# Patient Record
Sex: FEMALE | Race: WHITE | Employment: OTHER | URBAN - METROPOLITAN AREA
[De-identification: names, ages, dates, MRNs, and addresses within clinical notes are randomized per-mention and may not be internally consistent; named-entity substitution may affect disease eponyms.]

---

## 2022-07-15 ENCOUNTER — HOSPITAL ENCOUNTER (OUTPATIENT)
Dept: NEUROLOGY | Age: 62
Discharge: HOME | End: 2022-07-15
Payer: COMMERCIAL

## 2022-07-15 DIAGNOSIS — G56.01 CARPAL TUNNEL SYNDROME ON RIGHT: ICD-10-CM

## 2022-07-15 PROCEDURE — 95910 NRV CNDJ TEST 7-8 STUDIES: CPT

## 2022-07-15 PROCEDURE — 95886 MUSC TEST DONE W/N TEST COMP: CPT

## 2022-07-15 NOTE — PROCEDURES
[address]                      [address], [address] [facility]                             ELECTROMYOGRAM REPORT    PATIENT NAME: Nayana Augustine                      :        1960  MED REC NO:   [id number]                            ROOM:  ACCOUNT NO:   [de-identified]                           ADMIT DATE: 07/15/2022  PROVIDER:     Ria Rich MD    DATE OF EM/15/2022    REFERRING PROVIDER:  Traci Araiza MD.    REASON FOR STUDY:  The patient was having numbness in the hands, being  worse on the right side. FINDINGS:  Motor nerve conduction velocities are normal in all the  nerves tested. F-wave latency is borderline in the right median nerve and normal in  other nerves tested. Distal motor and sensory latencies are normal in the ulnar nerves, but  delayed in the median nerves, being worse on the right side. On concentric needle electrode examination, mild denervation changes are  present in the abductor pollicis brevis muscles bilaterally. 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 The patient is being tried on conservative management. Due to continued symptoms, she will need  decompression of the median nerves. Thank you Dr. Lesli Amanda for allowing me to see this patient. Please  feel free to call me if I can be of any further assistance regarding  this patient's evaluation.         Madie Richter MD    D: 07/15/2022 16:43:25       T: 07/15/2022 16:46:57     [email or web address]  Job#: [id number]     Doc#: [id number]    CC:

## 2022-08-06 LAB
SARS-COV-2, PCR: NOT DETECTED
SPECIMEN SOURCE: NORMAL

## 2023-10-14 ENCOUNTER — LAB (OUTPATIENT)
Dept: LAB | Facility: LAB | Age: 63
End: 2023-10-14
Payer: COMMERCIAL

## 2023-10-14 DIAGNOSIS — E03.9 HYPOTHYROIDISM, UNSPECIFIED: Primary | ICD-10-CM

## 2023-10-14 DIAGNOSIS — E78.5 HYPERLIPIDEMIA, UNSPECIFIED: ICD-10-CM

## 2023-10-14 DIAGNOSIS — E78.5 HYPERLIPIDEMIA, UNSPECIFIED: Primary | ICD-10-CM

## 2023-10-14 DIAGNOSIS — E03.9 HYPOTHYROIDISM, UNSPECIFIED: ICD-10-CM

## 2023-10-14 LAB
25(OH)D3 SERPL-MCNC: 40 NG/ML (ref 30–100)
ALBUMIN SERPL BCP-MCNC: 3.9 G/DL (ref 3.4–5)
ALP SERPL-CCNC: 66 U/L (ref 33–136)
ALT SERPL W P-5'-P-CCNC: 13 U/L (ref 7–45)
ANION GAP SERPL CALC-SCNC: 11 MMOL/L (ref 10–20)
AST SERPL W P-5'-P-CCNC: 14 U/L (ref 9–39)
BILIRUB SERPL-MCNC: 0.6 MG/DL (ref 0–1.2)
BUN SERPL-MCNC: 14 MG/DL (ref 6–23)
CALCIUM SERPL-MCNC: 9.1 MG/DL (ref 8.6–10.3)
CHLORIDE SERPL-SCNC: 105 MMOL/L (ref 98–107)
CHOLEST SERPL-MCNC: 228 MG/DL (ref 0–199)
CHOLESTEROL/HDL RATIO: 4.8
CO2 SERPL-SCNC: 26 MMOL/L (ref 21–32)
CREAT SERPL-MCNC: 0.85 MG/DL (ref 0.5–1.05)
ERYTHROCYTE [DISTWIDTH] IN BLOOD BY AUTOMATED COUNT: 13.4 % (ref 11.5–14.5)
GFR SERPL CREATININE-BSD FRML MDRD: 77 ML/MIN/1.73M*2
GLUCOSE SERPL-MCNC: 91 MG/DL (ref 74–99)
HCT VFR BLD AUTO: 40 % (ref 36–46)
HDLC SERPL-MCNC: 47.1 MG/DL
HGB BLD-MCNC: 12.5 G/DL (ref 12–16)
LDLC SERPL CALC-MCNC: 166 MG/DL
MCH RBC QN AUTO: 28.1 PG (ref 26–34)
MCHC RBC AUTO-ENTMCNC: 31.3 G/DL (ref 32–36)
MCV RBC AUTO: 90 FL (ref 80–100)
NON HDL CHOLESTEROL: 181 MG/DL (ref 0–149)
NRBC BLD-RTO: 0 /100 WBCS (ref 0–0)
PLATELET # BLD AUTO: 298 X10*3/UL (ref 150–450)
PMV BLD AUTO: 11.9 FL (ref 7.5–11.5)
POTASSIUM SERPL-SCNC: 4.3 MMOL/L (ref 3.5–5.3)
PROT SERPL-MCNC: 6.5 G/DL (ref 6.4–8.2)
RBC # BLD AUTO: 4.45 X10*6/UL (ref 4–5.2)
SODIUM SERPL-SCNC: 138 MMOL/L (ref 136–145)
T4 FREE SERPL-MCNC: 1.49 NG/DL (ref 0.61–1.12)
TRIGL SERPL-MCNC: 77 MG/DL (ref 0–149)
TSH SERPL-ACNC: 0.61 MIU/L (ref 0.44–3.98)
VLDL: 15 MG/DL (ref 0–40)
WBC # BLD AUTO: 6.5 X10*3/UL (ref 4.4–11.3)

## 2023-10-14 PROCEDURE — 80061 LIPID PANEL: CPT

## 2023-10-14 PROCEDURE — 84439 ASSAY OF FREE THYROXINE: CPT

## 2023-10-14 PROCEDURE — 85027 COMPLETE CBC AUTOMATED: CPT

## 2023-10-14 PROCEDURE — 36415 COLL VENOUS BLD VENIPUNCTURE: CPT

## 2023-10-14 PROCEDURE — 84443 ASSAY THYROID STIM HORMONE: CPT

## 2023-10-14 PROCEDURE — 80053 COMPREHEN METABOLIC PANEL: CPT

## 2023-10-14 PROCEDURE — 82306 VITAMIN D 25 HYDROXY: CPT

## 2023-10-17 PROBLEM — N95.1 MENOPAUSAL STATE: Status: ACTIVE | Noted: 2023-10-17

## 2023-10-17 PROBLEM — H69.91 DYSFUNCTION OF RIGHT EUSTACHIAN TUBE: Status: ACTIVE | Noted: 2023-10-17

## 2023-10-17 PROBLEM — G56.01 CARPAL TUNNEL SYNDROME OF RIGHT WRIST: Status: ACTIVE | Noted: 2023-10-17

## 2023-10-17 PROBLEM — G56.02 CARPAL TUNNEL SYNDROME OF LEFT WRIST: Status: ACTIVE | Noted: 2023-10-17

## 2023-10-17 PROBLEM — E66.9 CLASS 1 OBESITY WITH BODY MASS INDEX (BMI) OF 34.0 TO 34.9 IN ADULT: Status: ACTIVE | Noted: 2023-10-17

## 2023-10-17 PROBLEM — E66.811 CLASS 1 OBESITY WITH BODY MASS INDEX (BMI) OF 34.0 TO 34.9 IN ADULT: Status: ACTIVE | Noted: 2023-10-17

## 2023-10-17 PROBLEM — E78.5 HYPERLIPIDEMIA: Status: ACTIVE | Noted: 2023-10-17

## 2023-10-17 PROBLEM — E66.811 OBESITY (BMI 30.0-34.9): Status: ACTIVE | Noted: 2023-10-17

## 2023-10-17 PROBLEM — M25.542 PAIN IN THUMB JOINT WITH MOVEMENT OF LEFT HAND: Status: ACTIVE | Noted: 2023-10-17

## 2023-10-17 PROBLEM — J32.9 SINUSITIS, CHRONIC: Status: ACTIVE | Noted: 2023-10-17

## 2023-10-17 PROBLEM — G47.00 INSOMNIA: Status: ACTIVE | Noted: 2023-10-17

## 2023-10-17 PROBLEM — E03.9 HYPOTHYROIDISM: Status: ACTIVE | Noted: 2023-10-17

## 2023-10-17 PROBLEM — R20.2 NUMBNESS AND TINGLING IN LEFT HAND: Status: ACTIVE | Noted: 2023-10-17

## 2023-10-17 PROBLEM — R20.0 NUMBNESS AND TINGLING IN LEFT HAND: Status: ACTIVE | Noted: 2023-10-17

## 2023-10-17 PROBLEM — E66.9 OBESITY (BMI 30.0-34.9): Status: ACTIVE | Noted: 2023-10-17

## 2023-10-17 PROBLEM — F41.9 ANXIETY: Status: ACTIVE | Noted: 2023-10-17

## 2023-10-17 RX ORDER — ALBUTEROL SULFATE 90 UG/1
2 AEROSOL, METERED RESPIRATORY (INHALATION) EVERY 4 HOURS PRN
COMMUNITY
Start: 2014-12-30 | End: 2023-10-18 | Stop reason: WASHOUT

## 2023-10-17 RX ORDER — ACETAMINOPHEN 500 MG
5000 TABLET ORAL DAILY
COMMUNITY

## 2023-10-17 RX ORDER — CITALOPRAM 20 MG/1
20 TABLET, FILM COATED ORAL DAILY
COMMUNITY
End: 2023-10-18 | Stop reason: WASHOUT

## 2023-10-17 RX ORDER — CODEINE PHOSPHATE AND GUAIFENESIN 10; 100 MG/5ML; MG/5ML
5-10 SOLUTION ORAL DAILY PRN
COMMUNITY
Start: 2014-12-30 | End: 2023-10-18 | Stop reason: WASHOUT

## 2023-10-17 RX ORDER — LEVOTHYROXINE SODIUM 100 UG/1
100 TABLET ORAL DAILY
COMMUNITY
Start: 2019-05-02 | End: 2024-06-03 | Stop reason: SDUPTHER

## 2023-10-17 RX ORDER — PREDNISONE 20 MG/1
20 TABLET ORAL DAILY
COMMUNITY
Start: 2022-05-06 | End: 2023-10-18 | Stop reason: WASHOUT

## 2023-10-17 RX ORDER — HYDROXYZINE HYDROCHLORIDE 25 MG/1
.5-1 TABLET, FILM COATED ORAL NIGHTLY PRN
COMMUNITY
Start: 2021-05-16 | End: 2023-10-18 | Stop reason: SDUPTHER

## 2023-10-17 RX ORDER — ESCITALOPRAM OXALATE 5 MG/1
10 TABLET ORAL DAILY
COMMUNITY
Start: 2020-04-20 | End: 2023-10-18 | Stop reason: DRUGHIGH

## 2023-10-17 RX ORDER — FLUTICASONE PROPIONATE 50 MCG
2 SPRAY, SUSPENSION (ML) NASAL DAILY
COMMUNITY
Start: 2022-07-17 | End: 2024-05-17 | Stop reason: WASHOUT

## 2023-10-17 RX ORDER — LORATADINE PSEUDOEPHEDRINE SULFATE 10; 240 MG/1; MG/1
1 TABLET, EXTENDED RELEASE ORAL DAILY
COMMUNITY
Start: 2004-07-19 | End: 2023-10-18 | Stop reason: ALTCHOICE

## 2023-10-17 RX ORDER — OXYCODONE AND ACETAMINOPHEN 5; 325 MG/1; MG/1
TABLET ORAL
COMMUNITY
Start: 2023-10-03 | End: 2023-10-18 | Stop reason: WASHOUT

## 2023-10-17 RX ORDER — THYROID 120 MG/1
120 TABLET ORAL DAILY
COMMUNITY
End: 2023-10-18 | Stop reason: WASHOUT

## 2023-10-17 RX ORDER — LEVOTHYROXINE SODIUM 112 UG/1
1 TABLET ORAL DAILY
COMMUNITY
Start: 2004-07-19 | End: 2023-10-18 | Stop reason: WASHOUT

## 2023-10-17 RX ORDER — BENZONATATE 100 MG/1
100 CAPSULE ORAL 3 TIMES DAILY PRN
COMMUNITY
Start: 2014-12-30 | End: 2023-10-18 | Stop reason: WASHOUT

## 2023-10-18 ENCOUNTER — OFFICE VISIT (OUTPATIENT)
Dept: PRIMARY CARE | Facility: CLINIC | Age: 63
End: 2023-10-18
Payer: COMMERCIAL

## 2023-10-18 VITALS — TEMPERATURE: 97.2 F | DIASTOLIC BLOOD PRESSURE: 84 MMHG | SYSTOLIC BLOOD PRESSURE: 131 MMHG | HEART RATE: 74 BPM

## 2023-10-18 DIAGNOSIS — E78.5 HYPERLIPIDEMIA, UNSPECIFIED HYPERLIPIDEMIA TYPE: ICD-10-CM

## 2023-10-18 DIAGNOSIS — F51.04 PSYCHOPHYSIOLOGICAL INSOMNIA: ICD-10-CM

## 2023-10-18 DIAGNOSIS — F41.9 ANXIETY: Primary | ICD-10-CM

## 2023-10-18 DIAGNOSIS — Z12.11 ENCOUNTER FOR SCREENING FOR MALIGNANT NEOPLASM OF COLON: ICD-10-CM

## 2023-10-18 PROCEDURE — 99214 OFFICE O/P EST MOD 30 MIN: CPT | Performed by: FAMILY MEDICINE

## 2023-10-18 PROCEDURE — 1036F TOBACCO NON-USER: CPT | Performed by: FAMILY MEDICINE

## 2023-10-18 RX ORDER — HYDROXYZINE HYDROCHLORIDE 25 MG/1
25 TABLET, FILM COATED ORAL NIGHTLY
Qty: 90 TABLET | Refills: 1 | Status: SHIPPED | OUTPATIENT
Start: 2023-10-18 | End: 2024-05-17 | Stop reason: SDUPTHER

## 2023-10-18 RX ORDER — ESCITALOPRAM OXALATE 20 MG/1
20 TABLET ORAL DAILY
Qty: 90 TABLET | Refills: 1 | Status: SHIPPED | OUTPATIENT
Start: 2023-10-18 | End: 2024-05-17 | Stop reason: SDUPTHER

## 2023-10-18 RX ORDER — ESCITALOPRAM OXALATE 10 MG/1
15 TABLET ORAL DAILY
Qty: 45 TABLET | Refills: 0 | Status: SHIPPED | OUTPATIENT
Start: 2023-10-18 | End: 2023-10-18 | Stop reason: SDUPTHER

## 2023-10-18 ASSESSMENT — ENCOUNTER SYMPTOMS
COUGH: 0
FATIGUE: 1
HEADACHES: 0
CHEST TIGHTNESS: 0
SLEEP DISTURBANCE: 0
SHORTNESS OF BREATH: 0
NERVOUS/ANXIOUS: 0

## 2023-10-18 NOTE — PROGRESS NOTES
Subjective   Patient ID: Sosa Feliz is a 63 y.o. female who presents for Follow-up (Lab follow up. ).    HPI   Here for medication follow-up and review of recent testing results  1.  Anxiety-symptoms reasonably well controlled with Lexapro.  Mother recently passed away and feels that she overall is coping well.  2.  Insomnia-uses hydroxyzine nightly.  Typically takes only half tablet, has increased to full tablet over the past few months because of her mother's decline.  Tolerates increased dose without any significant adverse effect.  3.  Hyperlipidemia-elevated LDL on recent lab results.  We reviewed current 10-year ASCVD risk of 5.5%.  Feels that she will be able to make positive lifestyle changes in the next 6 months.  Will have more time to dedicate to healthy food choices and exercise.    Review of Systems   Constitutional:  Positive for fatigue.   Respiratory:  Negative for cough, chest tightness and shortness of breath.    Cardiovascular:  Negative for chest pain and leg swelling.   Neurological:  Negative for headaches.   Psychiatric/Behavioral:  Negative for sleep disturbance. The patient is not nervous/anxious.        Objective   /84 (BP Location: Left arm, Patient Position: Sitting)   Pulse 74   Temp 36.2 °C (97.2 °F)     Physical Exam  Constitutional:       Appearance: Normal appearance.   HENT:      Head: Normocephalic and atraumatic.   Eyes:      Extraocular Movements: Extraocular movements intact.      Conjunctiva/sclera: Conjunctivae normal.      Pupils: Pupils are equal, round, and reactive to light.   Cardiovascular:      Rate and Rhythm: Normal rate and regular rhythm.      Pulses: Normal pulses.      Heart sounds: Normal heart sounds.   Pulmonary:      Effort: Pulmonary effort is normal.      Breath sounds: Normal breath sounds. No wheezing.   Musculoskeletal:         General: Normal range of motion.      Cervical back: Normal range of motion and neck supple.      Right lower leg: No  edema.      Left lower leg: No edema.   Lymphadenopathy:      Cervical: No cervical adenopathy.   Neurological:      Mental Status: She is alert.   Psychiatric:         Mood and Affect: Mood normal.         Thought Content: Thought content normal.         Judgment: Judgment normal.      Comments: Tearful when discussing her mother, but appropriate     Results  10/14/2023 615913/14/2023 1701  Comprehensive Metabolic Panel [[id number]]   Blood, Venous   Component Value Units   Glucose 91 mg/dL   Sodium 138 mmol/L   Potassium 4.3 mmol/L   Chloride 105 mmol/L   Bicarbonate 26 mmol/L   Anion Gap 11 mmol/L   Urea Nitrogen 14 mg/dL   Creatinine 0.85 mg/dL   eGFR 77  mL/min/1.73m*2   Calcium 9.1 mg/dL   Albumin 3.9 g/dL   Alkaline Phosphatase 66 U/L   Total Protein 6.5 g/dL   AST 14 U/L   Bilirubin, Total 0.6 mg/dL   ALT 13  U/L   10/14/2023 067739/14/2023 1701  Lipid Panel [[id number]]   (Abnormal)   Blood, Venous   Component Value Units   Cholesterol 228 High   mg/dL   HDL-Cholesterol 47.1  mg/dL   Cholesterol/HDL Ratio 4.8     LDL Calculated 166 High   mg/dL   VLDL 15 mg/dL   Triglycerides 77  mg/dL   Non HDL Cholesterol 181 High   mg/dL       Assessment/Plan   Diagnoses and all orders for this visit:  Anxiety  Increase Lexapro dose to 20 mg to help manage acute symptoms related to loss of mother.  We will reassess at follow-up  Updated referral to establish new therapist  Continue hydroxyzine  -     escitalopram (Lexapro) 20 mg tablet; Take 1 tablet (20 mg) by mouth once daily.  -     hydrOXYzine HCL (Atarax) 25 mg tablet; Take 1 tablet (25 mg) by mouth once daily at bedtime.  -     Referral to Psychology; Future  Hyperlipidemia, unspecified hyperlipidemia type  Discussed diet and lifestyle strategies to help improve lipids.  Declined referral to nutrition at present.  We will reassess in 6 to 12 months  Encounter for screening for malignant neoplasm of colon  Counseled regarding options for colon cancer screening.  Remote  previous colonoscopy which was -15 years ago.  Agreeable to Cologuard but aware of need for follow-up colonoscopy if positive screening Cologuard result.  -     Cologuard® colon cancer screening; Future  Psychophysiological insomnia  Continue hydroxyzine  Other orders  -     Follow Up In Primary Care - Established; Future

## 2023-12-15 LAB — NONINV COLON CA DNA+OCC BLD SCRN STL QL: NEGATIVE

## 2024-01-19 ENCOUNTER — TELEMEDICINE (OUTPATIENT)
Dept: PRIMARY CARE | Facility: CLINIC | Age: 64
End: 2024-01-19
Payer: COMMERCIAL

## 2024-01-19 DIAGNOSIS — H65.91 RIGHT NON-SUPPURATIVE OTITIS MEDIA: Primary | ICD-10-CM

## 2024-01-19 PROCEDURE — 99213 OFFICE O/P EST LOW 20 MIN: CPT | Performed by: FAMILY MEDICINE

## 2024-01-19 RX ORDER — AMOXICILLIN 875 MG/1
875 TABLET, FILM COATED ORAL 2 TIMES DAILY
Qty: 20 TABLET | Refills: 0 | Status: SHIPPED | OUTPATIENT
Start: 2024-01-19 | End: 2024-01-29

## 2024-01-19 ASSESSMENT — ENCOUNTER SYMPTOMS
DIZZINESS: 0
SINUS PRESSURE: 1
SORE THROAT: 1
SINUS PAIN: 0
SHORTNESS OF BREATH: 0
CHEST TIGHTNESS: 0
COUGH: 1
FATIGUE: 0
FEVER: 0
HEADACHES: 1
ABDOMINAL PAIN: 0

## 2024-01-19 ASSESSMENT — PATIENT HEALTH QUESTIONNAIRE - PHQ9
2. FEELING DOWN, DEPRESSED OR HOPELESS: NOT AT ALL
SUM OF ALL RESPONSES TO PHQ9 QUESTIONS 1 AND 2: 0
1. LITTLE INTEREST OR PLEASURE IN DOING THINGS: NOT AT ALL

## 2024-01-19 NOTE — PROGRESS NOTES
"Subjective   Patient ID: Sosa Feliz is a 64 y.o. female who presents for Ear Fullness (Right ear \"plugged up\").  Virtual or Telephone Consent    An interactive audio and video telecommunication system which permits real time communications between the patient (at the originating site) and provider (at the distant site) was utilized to provide this telehealth service.   Verbal consent was requested and obtained from Caryn Feliz on this date, 01/19/24 for a telehealth visit.   MUKUL sxs began 1 week ago.  Flew back from [place] last weekend and during flight-onset of severe R ear pain/ pressure.  Pain has persisted.  Hearing has been affected.  Using flonase, robitussin, oral decongestant, acetaminophen with no significant relief.  Ear Fullness   There is pain in the right ear. This is a new problem. The current episode started in the past 7 days. The problem has been unchanged. There has been no fever. Associated symptoms include coughing, headaches, hearing loss and a sore throat. Pertinent negatives include no abdominal pain. She has tried acetaminophen for the symptoms.   Earache   There is pain in the right ear. This is a new problem. The current episode started in the past 7 days. The problem has been unchanged. There has been no fever. The pain is at a severity of 8/10. Associated symptoms include coughing, headaches, hearing loss and a sore throat. Pertinent negatives include no abdominal pain.        Review of Systems   Constitutional:  Negative for fatigue and fever.   HENT:  Positive for congestion, ear pain, hearing loss, sinus pressure and sore throat. Negative for sinus pain.    Respiratory:  Positive for cough. Negative for chest tightness and shortness of breath.    Cardiovascular:  Negative for chest pain.   Gastrointestinal:  Negative for abdominal pain.   Neurological:  Positive for headaches. Negative for dizziness.       Objective   There were no vitals taken for this visit.    Physical " Exam  Constitutional:       Appearance: Normal appearance.   HENT:      Right Ear: External ear normal.      Left Ear: External ear normal.      Ears:      Comments: Denies tenderness with palpation over the right tragus.     Nose:      Comments: Denies pain with self-palpation over frontal or maxillary sinuses.     Mouth/Throat:      Mouth: Mucous membranes are moist.   Pulmonary:      Effort: Pulmonary effort is normal. No respiratory distress.   Musculoskeletal:      Cervical back: No tenderness (With self examination).   Lymphadenopathy:      Cervical: No cervical adenopathy.   Neurological:      General: No focal deficit present.      Mental Status: She is alert. Mental status is at baseline.   Psychiatric:         Mood and Affect: Mood normal.         Thought Content: Thought content normal.         Judgment: Judgment normal.         Assessment/Plan   Diagnoses and all orders for this visit:  Right non-suppurative otitis media  -     amoxicillin (Amoxil) 875 mg tablet; Take 1 tablet (875 mg) by mouth 2 times a day for 10 days.  Continue Flonase, Robitussin.  Trial of Afrin nasal spray for 1 to 2 days to see if this opens up inner ear canals.  Call if symptoms not improved

## 2024-01-22 ENCOUNTER — OFFICE VISIT (OUTPATIENT)
Dept: PRIMARY CARE | Facility: CLINIC | Age: 64
End: 2024-01-22
Payer: COMMERCIAL

## 2024-01-22 ENCOUNTER — PATIENT MESSAGE (OUTPATIENT)
Dept: PRIMARY CARE | Facility: CLINIC | Age: 64
End: 2024-01-22

## 2024-01-22 VITALS
DIASTOLIC BLOOD PRESSURE: 79 MMHG | SYSTOLIC BLOOD PRESSURE: 157 MMHG | HEIGHT: 59 IN | BODY MASS INDEX: 34.73 KG/M2 | TEMPERATURE: 97.3 F | HEART RATE: 61 BPM

## 2024-01-22 DIAGNOSIS — H69.91 DYSFUNCTION OF RIGHT EUSTACHIAN TUBE: Primary | ICD-10-CM

## 2024-01-22 DIAGNOSIS — H66.001 ACUTE SUPPURATIVE OTITIS MEDIA OF RIGHT EAR WITHOUT SPONTANEOUS RUPTURE OF TYMPANIC MEMBRANE, RECURRENCE NOT SPECIFIED: Primary | ICD-10-CM

## 2024-01-22 PROCEDURE — 99212 OFFICE O/P EST SF 10 MIN: CPT | Performed by: FAMILY MEDICINE

## 2024-01-22 PROCEDURE — 1036F TOBACCO NON-USER: CPT | Performed by: FAMILY MEDICINE

## 2024-01-22 ASSESSMENT — PATIENT HEALTH QUESTIONNAIRE - PHQ9
1. LITTLE INTEREST OR PLEASURE IN DOING THINGS: NOT AT ALL
2. FEELING DOWN, DEPRESSED OR HOPELESS: NOT AT ALL
SUM OF ALL RESPONSES TO PHQ9 QUESTIONS 1 AND 2: 0

## 2024-01-22 NOTE — PROGRESS NOTES
"Subjective   Patient ID: Sosa Feliz is a 64 y.o. female who presents for Heart Problem (Hearing loss of right ear x 10 days).    HPI   Follow-up right ear pain.  Seen virtually last Friday.  Started amoxicillin.  Has completed 3-1/2 days of therapy.  Reports persistence of ear pain if not taking Tylenol consistently.  Still has reduced hearing in the right ear.  No drainage from the ear.  No significant nasal congestion.  No fever or chills.  No headaches.  No dizziness.  Review of Systems  Per HPI  Objective   /79 (BP Location: Left arm, Patient Position: Sitting, BP Cuff Size: Large adult)   Pulse 61   Temp 36.3 °C (97.3 °F)   Ht 1.499 m (4' 11\")   BMI 34.73 kg/m²     Physical Exam  Constitutional:       General: She is not in acute distress.     Appearance: Normal appearance.   HENT:      Head: Normocephalic and atraumatic.      Right Ear: Ear canal and external ear normal. Decreased hearing noted. A middle ear effusion (Mucopurulent) is present. There is no impacted cerumen. No mastoid tenderness. Tympanic membrane is bulging (Mildly distended). Tympanic membrane is not injected, perforated or erythematous.      Left Ear: Ear canal and external ear normal. No decreased hearing noted. A middle ear effusion is present. There is no impacted cerumen. No mastoid tenderness. Tympanic membrane is not perforated, erythematous or bulging.      Nose: Nose normal.      Mouth/Throat:      Mouth: Mucous membranes are moist.      Pharynx: Oropharynx is clear.   Cardiovascular:      Rate and Rhythm: Normal rate and regular rhythm.      Pulses: Normal pulses.      Heart sounds: Normal heart sounds.   Pulmonary:      Effort: Pulmonary effort is normal.      Breath sounds: Normal breath sounds.   Musculoskeletal:      Cervical back: Normal range of motion and neck supple.   Lymphadenopathy:      Cervical: No cervical adenopathy.   Skin:     General: Skin is dry.   Neurological:      Mental Status: She is alert. "         Assessment/Plan   Diagnoses and all orders for this visit:  Acute suppurative otitis media of right ear without spontaneous rupture of tympanic membrane, recurrence not specified  Continue amoxicillin.  Recommend she try spacing acetaminophen doses to see if pain is persistent or worsening.  If no improvement in pain over the next few days, call for alternate antibiotic therapy.

## 2024-01-26 ENCOUNTER — TELEPHONE (OUTPATIENT)
Dept: PRIMARY CARE | Facility: CLINIC | Age: 64
End: 2024-01-26
Payer: COMMERCIAL

## 2024-01-26 RX ORDER — PREDNISONE 20 MG/1
40 TABLET ORAL DAILY
Qty: 6 TABLET | Refills: 0 | Status: SHIPPED | OUTPATIENT
Start: 2024-01-26 | End: 2024-01-29

## 2024-01-26 NOTE — TELEPHONE ENCOUNTER
Patient is asking if a steroid, discussed this morning, can be called into     [id number] - [place], [place] - [address] AT [address] Phone: [phone]   Fax: [phone]

## 2024-02-09 ENCOUNTER — OFFICE VISIT (OUTPATIENT)
Dept: PRIMARY CARE | Facility: CLINIC | Age: 64
End: 2024-02-09
Payer: COMMERCIAL

## 2024-02-09 VITALS
OXYGEN SATURATION: 96 % | DIASTOLIC BLOOD PRESSURE: 82 MMHG | SYSTOLIC BLOOD PRESSURE: 131 MMHG | HEIGHT: 59 IN | HEART RATE: 79 BPM | BODY MASS INDEX: 34.73 KG/M2

## 2024-02-09 DIAGNOSIS — R05.2 SUBACUTE COUGH: ICD-10-CM

## 2024-02-09 DIAGNOSIS — J01.00 ACUTE NON-RECURRENT MAXILLARY SINUSITIS: Primary | ICD-10-CM

## 2024-02-09 PROCEDURE — 1036F TOBACCO NON-USER: CPT | Performed by: FAMILY MEDICINE

## 2024-02-09 PROCEDURE — 99213 OFFICE O/P EST LOW 20 MIN: CPT | Performed by: FAMILY MEDICINE

## 2024-02-09 RX ORDER — GUAIFENESIN 600 MG/1
1200 TABLET, EXTENDED RELEASE ORAL 2 TIMES DAILY
Qty: 40 TABLET | Refills: 0 | Status: SHIPPED | OUTPATIENT
Start: 2024-02-09 | End: 2024-02-19

## 2024-02-09 RX ORDER — DOXYCYCLINE 100 MG/1
100 CAPSULE ORAL 2 TIMES DAILY
Qty: 14 CAPSULE | Refills: 0 | Status: SHIPPED | OUTPATIENT
Start: 2024-02-09 | End: 2024-02-16

## 2024-02-09 RX ORDER — BENZONATATE 200 MG/1
200 CAPSULE ORAL 3 TIMES DAILY PRN
Qty: 30 CAPSULE | Refills: 0 | Status: SHIPPED | OUTPATIENT
Start: 2024-02-09 | End: 2024-02-19

## 2024-02-09 ASSESSMENT — ENCOUNTER SYMPTOMS
CONSTIPATION: 0
SHORTNESS OF BREATH: 1
SORE THROAT: 0
NAUSEA: 0
HEADACHES: 1
VOMITING: 0
DIARRHEA: 0
COUGH: 1

## 2024-02-09 NOTE — PROGRESS NOTES
"Answers submitted by the patient for this visit:  Cough Questionnaire (Submitted on 2/9/2024)  Chief Complaint: Cough  Chronicity: recurrent  Onset: more than 1 month ago  Progression since onset: waxing and waning  Frequency: hourly  Cough characteristics: productive of sputum  ear congestion: Yes  headaches: Yes  nasal congestion: Yes  Aggravated by: nothing  Subjective   Patient ID: Sosa Feliz is a 64 y.o. female who presents for Cough and green mucus when blowing nose x 4 weeks.    Cough  This is a recurrent problem. The current episode started more than 1 month ago. The problem has been waxing and waning. The problem occurs hourly. The cough is Productive of sputum. Associated symptoms include ear congestion, ear pain, headaches, nasal congestion and shortness of breath (with prolonged activity). Pertinent negatives include no sore throat. Nothing aggravates the symptoms.      Persistent cough, nasal congestion, now with thick, green nasal discharge despite previous course of antibiotic therapy with amoxicillin.  Also took 3-day course of steroids.  These interventions did help to alleviate her ear pain  Has some shortness of breath, typically after coughing spells.  No wheezing.  Review of Systems   HENT:  Positive for congestion and ear pain. Negative for sore throat.    Respiratory:  Positive for cough and shortness of breath (with prolonged activity).    Gastrointestinal:  Negative for constipation, diarrhea, nausea and vomiting.   Neurological:  Positive for headaches.       Objective   /82   Pulse 79   Ht 1.499 m (4' 11\")   SpO2 96%   BMI 34.73 kg/m²     Physical Exam  Vitals reviewed.   Constitutional:       General: She is not in acute distress.     Appearance: Normal appearance.   HENT:      Head: Normocephalic and atraumatic.      Right Ear: Tympanic membrane and ear canal normal.      Left Ear: Tympanic membrane and ear canal normal.      Nose: Congestion and rhinorrhea present. Rhinorrhea " is purulent.      Right Sinus: Maxillary sinus tenderness present. No frontal sinus tenderness.      Left Sinus: Maxillary sinus tenderness present. No frontal sinus tenderness.      Comments: Whitish discharge noted on the right.     Mouth/Throat:      Mouth: Mucous membranes are moist.      Pharynx: Oropharynx is clear. No oropharyngeal exudate or posterior oropharyngeal erythema.   Eyes:      Extraocular Movements: Extraocular movements intact.      Conjunctiva/sclera: Conjunctivae normal.      Pupils: Pupils are equal, round, and reactive to light.   Cardiovascular:      Rate and Rhythm: Normal rate and regular rhythm.      Pulses: Normal pulses.      Heart sounds: Normal heart sounds. No murmur heard.  Pulmonary:      Effort: Pulmonary effort is normal. No respiratory distress.      Breath sounds: Normal breath sounds. No wheezing or rhonchi.   Abdominal:      Tenderness: There is no abdominal tenderness.   Musculoskeletal:      Cervical back: Normal range of motion and neck supple.      Right lower leg: No edema.      Left lower leg: No edema.   Lymphadenopathy:      Cervical: No cervical adenopathy.   Skin:     General: Skin is warm and dry.   Neurological:      General: No focal deficit present.      Mental Status: She is alert and oriented to person, place, and time.   Psychiatric:         Mood and Affect: Mood normal.         Thought Content: Thought content normal.         Assessment/Plan   Diagnoses and all orders for this visit:  Acute non-recurrent maxillary sinusitis  -     doxycycline (Vibramycin) 100 mg capsule; Take 1 capsule (100 mg) by mouth 2 times a day for 7 days. Take with at least 8 ounces (large glass) of water, do not lie down for 30 minutes after  Subacute cough  -     benzonatate (Tessalon) 200 mg capsule; Take 1 capsule (200 mg) by mouth 3 times a day as needed for cough for up to 10 days. Do not crush or chew.  -     guaiFENesin (Mucinex) 600 mg 12 hr tablet; Take 2 tablets (1,200 mg)  by mouth 2 times a day for 10 days. Do not crush, chew, or split.

## 2024-02-26 ENCOUNTER — APPOINTMENT (OUTPATIENT)
Dept: PRIMARY CARE | Facility: CLINIC | Age: 64
End: 2024-02-26
Payer: COMMERCIAL

## 2024-03-05 ENCOUNTER — APPOINTMENT (OUTPATIENT)
Dept: OTOLARYNGOLOGY | Facility: CLINIC | Age: 64
End: 2024-03-05
Payer: COMMERCIAL

## 2024-03-05 ENCOUNTER — APPOINTMENT (OUTPATIENT)
Dept: AUDIOLOGY | Facility: CLINIC | Age: 64
End: 2024-03-05
Payer: COMMERCIAL

## 2024-04-15 ENCOUNTER — OFFICE VISIT (OUTPATIENT)
Dept: PRIMARY CARE | Facility: CLINIC | Age: 64
End: 2024-04-15
Payer: COMMERCIAL

## 2024-04-15 VITALS
TEMPERATURE: 100.4 F | SYSTOLIC BLOOD PRESSURE: 128 MMHG | HEART RATE: 108 BPM | BODY MASS INDEX: 34.73 KG/M2 | DIASTOLIC BLOOD PRESSURE: 85 MMHG | HEIGHT: 59 IN

## 2024-04-15 DIAGNOSIS — J06.9 URI WITH COUGH AND CONGESTION: Primary | ICD-10-CM

## 2024-04-15 LAB
POC RAPID INFLUENZA A: NEGATIVE
POC RAPID INFLUENZA B: NEGATIVE

## 2024-04-15 PROCEDURE — 87804 INFLUENZA ASSAY W/OPTIC: CPT | Performed by: FAMILY MEDICINE

## 2024-04-15 PROCEDURE — 87636 SARSCOV2 & INF A&B AMP PRB: CPT

## 2024-04-15 PROCEDURE — 99213 OFFICE O/P EST LOW 20 MIN: CPT | Performed by: FAMILY MEDICINE

## 2024-04-15 RX ORDER — GUAIFENESIN 600 MG/1
1200 TABLET, EXTENDED RELEASE ORAL 2 TIMES DAILY
Start: 2024-04-15 | End: 2024-05-17 | Stop reason: WASHOUT

## 2024-04-15 RX ORDER — BENZONATATE 200 MG/1
200 CAPSULE ORAL 3 TIMES DAILY PRN
Start: 2024-04-15 | End: 2024-05-17 | Stop reason: WASHOUT

## 2024-04-15 ASSESSMENT — ENCOUNTER SYMPTOMS
COUGH: 1
FEVER: 1
CHILLS: 1
RHINORRHEA: 1
VOMITING: 0
HEADACHES: 1
APPETITE CHANGE: 1
SORE THROAT: 1
SWEATS: 1
DIARRHEA: 0
CONSTIPATION: 0
FATIGUE: 1
NAUSEA: 1

## 2024-04-15 ASSESSMENT — PATIENT HEALTH QUESTIONNAIRE - PHQ9
SUM OF ALL RESPONSES TO PHQ9 QUESTIONS 1 AND 2: 0
1. LITTLE INTEREST OR PLEASURE IN DOING THINGS: NOT AT ALL
2. FEELING DOWN, DEPRESSED OR HOPELESS: NOT AT ALL

## 2024-04-15 NOTE — PROGRESS NOTES
"Answers submitted by the patient for this visit:  Cough Questionnaire (Submitted on 4/15/2024)  Chief Complaint: Cough  Onset: in the past 7 days  Progression since onset: rapidly worsening  Frequency: every few minutes  Cough characteristics: non-productive  chest pain: Yes  chills: Yes  ear congestion: Yes  ear pain: Yes  headaches: Yes  nasal congestion: Yes  rhinorrhea: Yes  sweats: Yes  Aggravated by: nothing  Subjective   Patient ID: Sosa Feliz is a 64 y.o. female who presents for Cough (Productive cough, runny nose, chest congestion, nausea x4 days ).    Cough  The current episode started in the past 7 days. The problem has been rapidly worsening. The problem occurs every few minutes. The cough is Non-productive. Associated symptoms include chest pain, chills, ear congestion, ear pain, a fever, headaches, nasal congestion, rhinorrhea, a sore throat and sweats. Nothing aggravates the symptoms.   Onset Friday of acute upper respiratory symptoms.  Cough, congestion, headaches, malaise, chills, body aches, fever.  Tm 100.4  No covid testing.   Body aches.   Increased fatigue and loss of appetite.  Weekend prior to symptom onset, did attend final 4 women's basketball games so was in large crowds.    Review of Systems   Constitutional:  Positive for appetite change, chills, fatigue and fever.   HENT:  Positive for ear pain, rhinorrhea, sneezing and sore throat.    Respiratory:  Positive for cough.    Cardiovascular:  Positive for chest pain.   Gastrointestinal:  Positive for nausea. Negative for constipation, diarrhea and vomiting.   Neurological:  Positive for headaches.       Objective   /85 (BP Location: Left arm, Patient Position: Sitting, BP Cuff Size: Large adult)   Pulse 108   Temp 38 °C (100.4 °F)   Ht 1.499 m (4' 11\")   BMI 34.73 kg/m²     Physical Exam  Constitutional:       Appearance: Normal appearance.   HENT:      Head: Normocephalic and atraumatic.      Right Ear: Hearing and ear canal " normal. No middle ear effusion. Tympanic membrane is injected.      Left Ear: Hearing and tympanic membrane normal.  No middle ear effusion. Tympanic membrane is not injected.      Nose: Congestion and rhinorrhea present. Rhinorrhea is clear.      Right Sinus: No maxillary sinus tenderness or frontal sinus tenderness.      Left Sinus: No maxillary sinus tenderness or frontal sinus tenderness.      Mouth/Throat:      Mouth: Mucous membranes are moist.      Pharynx: No oropharyngeal exudate or posterior oropharyngeal erythema.      Tonsils: No tonsillar exudate.   Eyes:      Extraocular Movements: Extraocular movements intact.      Conjunctiva/sclera: Conjunctivae normal.   Cardiovascular:      Rate and Rhythm: Regular rhythm.      Pulses: Normal pulses.      Heart sounds: Normal heart sounds.   Pulmonary:      Effort: Pulmonary effort is normal.      Breath sounds: Normal breath sounds. No wheezing, rhonchi or rales.      Comments: Moist cough, triggered with deep inspiration  Abdominal:      General: Abdomen is flat. Bowel sounds are normal.      Palpations: Abdomen is soft.   Musculoskeletal:      Cervical back: Normal range of motion and neck supple.      Right lower leg: No edema.      Left lower leg: No edema.   Lymphadenopathy:      Cervical: No cervical adenopathy.   Skin:     General: Skin is warm and dry.      Findings: No rash.   Neurological:      General: No focal deficit present.      Mental Status: She is alert. Mental status is at baseline.   Psychiatric:         Mood and Affect: Mood normal.         Thought Content: Thought content normal.         Judgment: Judgment normal.         Assessment/Plan   Diagnoses and all orders for this visit:  URI with cough and congestion  Rapid flu completed in office and negative  Tessalon Perles to help reduce coughing.  Patient reports previous prescription at home.  Use Mucinex to help loosen and clear secretions.  Will send swab for coronavirus and influenza PCR.   Counseled patient that if COVID test positive, she would be candidate for Paxlovid treatment.  Patient wishes to proceed with treatment if testing positive.  Reviewed proper dosing and administration as well as potential adverse effects  -     Sars-CoV-2 and Influenza A/B PCR  -     benzonatate (Tessalon) 200 mg capsule; Take 1 capsule (200 mg) by mouth 3 times a day as needed for cough. Do not crush or chew.  -     guaiFENesin (Mucinex) 600 mg 12 hr tablet; Take 2 tablets (1,200 mg) by mouth 2 times a day. Do not crush, chew, or split.  -     POCT Influenza A/B manually resulted

## 2024-04-17 LAB
FLUAV RNA RESP QL NAA+PROBE: NOT DETECTED
FLUBV RNA RESP QL NAA+PROBE: NOT DETECTED
SARS-COV-2 ORF1AB RESP QL NAA+PROBE: NOT DETECTED

## 2024-04-19 ENCOUNTER — PATIENT MESSAGE (OUTPATIENT)
Dept: PRIMARY CARE | Facility: CLINIC | Age: 64
End: 2024-04-19

## 2024-04-19 ENCOUNTER — APPOINTMENT (OUTPATIENT)
Dept: PRIMARY CARE | Facility: CLINIC | Age: 64
End: 2024-04-19
Payer: COMMERCIAL

## 2024-04-19 DIAGNOSIS — R05.9 COUGH, UNSPECIFIED TYPE: Primary | ICD-10-CM

## 2024-04-22 RX ORDER — DOXYCYCLINE 100 MG/1
100 CAPSULE ORAL 2 TIMES DAILY
Qty: 14 CAPSULE | Refills: 0 | Status: SHIPPED | OUTPATIENT
Start: 2024-04-22 | End: 2024-04-29

## 2024-05-13 ASSESSMENT — ENCOUNTER SYMPTOMS
NAUSEA: 1
ABDOMINAL PAIN: 1

## 2024-05-17 ENCOUNTER — OFFICE VISIT (OUTPATIENT)
Dept: PRIMARY CARE | Facility: CLINIC | Age: 64
End: 2024-05-17
Payer: COMMERCIAL

## 2024-05-17 VITALS
WEIGHT: 178 LBS | SYSTOLIC BLOOD PRESSURE: 148 MMHG | BODY MASS INDEX: 35.88 KG/M2 | HEIGHT: 59 IN | TEMPERATURE: 98.1 F | DIASTOLIC BLOOD PRESSURE: 84 MMHG | HEART RATE: 78 BPM

## 2024-05-17 DIAGNOSIS — Z12.31 SCREENING MAMMOGRAM FOR BREAST CANCER: ICD-10-CM

## 2024-05-17 DIAGNOSIS — E03.9 HYPOTHYROIDISM, UNSPECIFIED TYPE: ICD-10-CM

## 2024-05-17 DIAGNOSIS — Z00.00 ROUTINE GENERAL MEDICAL EXAMINATION AT A HEALTH CARE FACILITY: Primary | ICD-10-CM

## 2024-05-17 DIAGNOSIS — F41.9 ANXIETY: ICD-10-CM

## 2024-05-17 DIAGNOSIS — N95.0 POSTMENOPAUSAL BLEEDING: ICD-10-CM

## 2024-05-17 DIAGNOSIS — E66.9 OBESITY, CLASS II, BMI 35-39.9: ICD-10-CM

## 2024-05-17 DIAGNOSIS — E78.5 HYPERLIPIDEMIA, UNSPECIFIED HYPERLIPIDEMIA TYPE: ICD-10-CM

## 2024-05-17 DIAGNOSIS — M25.561 LATERAL KNEE PAIN, RIGHT: ICD-10-CM

## 2024-05-17 PROBLEM — E66.812 OBESITY, CLASS II, BMI 35-39.9: Status: ACTIVE | Noted: 2023-10-17

## 2024-05-17 PROBLEM — E66.811 CLASS 1 OBESITY WITH BODY MASS INDEX (BMI) OF 34.0 TO 34.9 IN ADULT: Status: RESOLVED | Noted: 2023-10-17 | Resolved: 2024-05-17

## 2024-05-17 PROCEDURE — 99396 PREV VISIT EST AGE 40-64: CPT | Performed by: FAMILY MEDICINE

## 2024-05-17 PROCEDURE — 99214 OFFICE O/P EST MOD 30 MIN: CPT | Performed by: FAMILY MEDICINE

## 2024-05-17 PROCEDURE — 1036F TOBACCO NON-USER: CPT | Performed by: FAMILY MEDICINE

## 2024-05-17 RX ORDER — HYDROXYZINE HYDROCHLORIDE 25 MG/1
25 TABLET, FILM COATED ORAL NIGHTLY
Qty: 90 TABLET | Refills: 1 | Status: SHIPPED | OUTPATIENT
Start: 2024-05-17

## 2024-05-17 RX ORDER — ESCITALOPRAM OXALATE 20 MG/1
20 TABLET ORAL DAILY
Qty: 90 TABLET | Refills: 1 | Status: SHIPPED | OUTPATIENT
Start: 2024-05-17

## 2024-05-17 ASSESSMENT — PATIENT HEALTH QUESTIONNAIRE - PHQ9
2. FEELING DOWN, DEPRESSED OR HOPELESS: NOT AT ALL
1. LITTLE INTEREST OR PLEASURE IN DOING THINGS: NOT AT ALL
SUM OF ALL RESPONSES TO PHQ9 QUESTIONS 1 AND 2: 0

## 2024-05-17 ASSESSMENT — ENCOUNTER SYMPTOMS
SHORTNESS OF BREATH: 0
FATIGUE: 0
NAUSEA: 1
NERVOUS/ANXIOUS: 0
HEADACHES: 0
FREQUENCY: 1
JOINT SWELLING: 1
CHEST TIGHTNESS: 0
DYSURIA: 0
WEAKNESS: 0
PALPITATIONS: 0
COUGH: 0
FEVER: 0
ABDOMINAL PAIN: 1
SLEEP DISTURBANCE: 0
DIFFICULTY URINATING: 0

## 2024-05-17 NOTE — PROGRESS NOTES
Subjective   Patient ID: Sosa Feliz is a 64 y.o. female who presents for Annual Exam (AWV/R knee pain).    Here for annual wellness visit, routine follow-up of medications, several new additional concerns    Nutrition: Reports eating habits recently have improved following completion of tax season.  Trying to increase fresh fruits and vegetables.  Exercise: Trying to become more active, walking, hiking  Eye exam: Up-to-date, possible early cataracts.  Dental care: Up-to-date    Hypothyroidism-stable on current dose of levothyroxine.  Compliant with regular dosing.  Anxiety-stable on current dose of Lexapro with as needed use of hydroxyzine to help with sleep    Right knee pain-discomfort in the outer aspect and posterior aspect of right knee, started after she took walk on [employer].  Denies any locking or give way symptoms, but feels knee occasionally catches.  No significant swelling.    In past 3 weeks, onset of lower abdominal cramping.  1 week ago, had 1 day episode of light vaginal spotting, managed with pantiliner.  Denies dysuria, urgency, frequency.    Abdominal Pain  The current episode started 1 to 4 weeks ago. The onset quality is sudden. The problem occurs constantly. The problem has been waxing and waning. The pain is located in the suprapubic region. The pain is at a severity of 5/10. The quality of the pain is cramping. The abdominal pain does not radiate. Associated symptoms include frequency and nausea. Pertinent negatives include no dysuria, fever or headaches. Nothing aggravates the pain. The pain is relieved by Nothing.        Review of Systems   Constitutional:  Negative for fatigue and fever.   Eyes:  Negative for visual disturbance.   Respiratory:  Negative for cough, chest tightness and shortness of breath.    Cardiovascular:  Negative for chest pain and palpitations.   Gastrointestinal:  Positive for abdominal pain and nausea.   Genitourinary:  Positive for frequency and vaginal  "bleeding (spotting x1 day last week. LMP 20 yrs ago.). Negative for difficulty urinating, dysuria and urgency.   Musculoskeletal:  Positive for joint swelling.        Post R knee pain x 3 weeks after walking on beach   Neurological:  Negative for weakness and headaches.   Psychiatric/Behavioral:  Negative for sleep disturbance. The patient is not nervous/anxious.        Objective   /84 (BP Location: Left arm, Patient Position: Sitting, BP Cuff Size: Large adult)   Pulse 78   Temp 36.7 °C (98.1 °F)   Ht 1.499 m (4' 11\")   Wt 80.7 kg (178 lb)   BMI 35.95 kg/m²     Physical Exam  Vitals reviewed.   Constitutional:       General: She is not in acute distress.     Appearance: Normal appearance. She is obese.   HENT:      Head: Normocephalic and atraumatic.      Right Ear: Tympanic membrane and ear canal normal.      Left Ear: Tympanic membrane and ear canal normal.      Nose: Nose normal. No congestion or rhinorrhea.      Mouth/Throat:      Mouth: Mucous membranes are moist.      Pharynx: Oropharynx is clear.   Eyes:      Extraocular Movements: Extraocular movements intact.      Conjunctiva/sclera: Conjunctivae normal.      Pupils: Pupils are equal, round, and reactive to light.   Cardiovascular:      Rate and Rhythm: Normal rate and regular rhythm.      Pulses: Normal pulses.      Heart sounds: Normal heart sounds. No murmur heard.  Pulmonary:      Effort: Pulmonary effort is normal. No respiratory distress.      Breath sounds: Normal breath sounds.   Chest:   Breasts:     Right: Normal. No inverted nipple, mass, nipple discharge or skin change.      Left: Normal. No inverted nipple, mass, nipple discharge or skin change.   Abdominal:      General: Abdomen is flat. Bowel sounds are normal.      Palpations: Abdomen is soft.      Tenderness: There is abdominal tenderness in the suprapubic area. There is no guarding or rebound.   Musculoskeletal:         General: Normal range of motion.      Cervical back: " Normal range of motion and neck supple.      Right knee: Crepitus present. Tenderness present over the lateral joint line.      Instability Tests: Lateral Peter test positive. Medial Peter test negative.      Left knee: Normal.      Comments: Mild fullness in the popliteal fossa of right knee   Lymphadenopathy:      Cervical: No cervical adenopathy.      Upper Body:      Right upper body: No axillary adenopathy.      Left upper body: No axillary adenopathy.   Skin:     General: Skin is warm and dry.   Neurological:      General: No focal deficit present.      Mental Status: She is alert and oriented to person, place, and time.   Psychiatric:         Mood and Affect: Mood normal.         Thought Content: Thought content normal.         Assessment/Plan   Diagnoses and all orders for this visit:  Routine general medical examination at a health care facility  Will be due for Pap test next January, will coordinate with welcome to Medicare exam  Continue efforts at healthy food choices, regular physical activity  Recommended vaccines.  She will return for nurse visit to initiate shingles vaccine series.  Plan for flu shot in the fall.  Screening mammogram for breast cancer  -     BI mammo bilateral screening tomosynthesis; Future  Anxiety  Continue Lexapro at current dose and as needed hydroxyzine  -     CBC and Auto Differential; Future  -     escitalopram (Lexapro) 20 mg tablet; Take 1 tablet (20 mg) by mouth once daily.  -     hydrOXYzine HCL (Atarax) 25 mg tablet; Take 1 tablet (25 mg) by mouth once daily at bedtime.  Hyperlipidemia, unspecified hyperlipidemia type  Check lipid to reassess cardiovascular disease risk  -     BI mammo bilateral screening tomosynthesis; Future  -     Comprehensive Metabolic Panel; Future  -     Lipid Panel; Future  Hypothyroidism, unspecified type  Continue levothyroxine at current dose, will adjust based on lab results  -     TSH with reflex to Free T4 if abnormal; Future  Lateral  knee pain, right  Obtain x-ray, component of osteoarthritis likely.  May have small lateral meniscal tear or Baker's cyst.  She already is scheduled to see orthopedics.  -     XR knee right 3 views; Future  Postmenopausal bleeding  Proceed with pelvic ultrasound to further evaluate lower abdominal cramping and postmenopausal bleeding.  We discussed potential need to return for pelvic exam  -     US PELVIS TRANSABDOMINAL WITH TRANSVAGINAL; Future  Obesity, Class II, BMI 35-39.9  -     Hemoglobin A1C; Future  Other orders  -     Follow Up In Primary Care - Established  -     Zoster vaccine, recombinant, adult (SHINGRIX); Future  -     Follow Up In Primary Care - Established; Future

## 2024-05-21 ENCOUNTER — LAB (OUTPATIENT)
Dept: LAB | Facility: LAB | Age: 64
End: 2024-05-21
Payer: COMMERCIAL

## 2024-05-21 ENCOUNTER — PATIENT MESSAGE (OUTPATIENT)
Dept: PRIMARY CARE | Facility: CLINIC | Age: 64
End: 2024-05-21

## 2024-05-21 DIAGNOSIS — E78.5 HYPERLIPIDEMIA, UNSPECIFIED HYPERLIPIDEMIA TYPE: ICD-10-CM

## 2024-05-21 DIAGNOSIS — E03.9 HYPOTHYROIDISM, UNSPECIFIED TYPE: ICD-10-CM

## 2024-05-21 DIAGNOSIS — N95.0 POSTMENOPAUSAL BLEEDING: Primary | ICD-10-CM

## 2024-05-21 DIAGNOSIS — F41.9 ANXIETY: ICD-10-CM

## 2024-05-21 DIAGNOSIS — E66.9 OBESITY, CLASS II, BMI 35-39.9: ICD-10-CM

## 2024-05-21 LAB
ALBUMIN SERPL BCP-MCNC: 4.2 G/DL (ref 3.4–5)
ALP SERPL-CCNC: 72 U/L (ref 33–136)
ALT SERPL W P-5'-P-CCNC: 12 U/L (ref 7–45)
ANION GAP SERPL CALC-SCNC: 12 MMOL/L (ref 10–20)
AST SERPL W P-5'-P-CCNC: 16 U/L (ref 9–39)
BASOPHILS # BLD AUTO: 0.05 X10*3/UL (ref 0–0.1)
BASOPHILS NFR BLD AUTO: 0.7 %
BILIRUB SERPL-MCNC: 0.8 MG/DL (ref 0–1.2)
BUN SERPL-MCNC: 16 MG/DL (ref 6–23)
CALCIUM SERPL-MCNC: 8.9 MG/DL (ref 8.6–10.3)
CHLORIDE SERPL-SCNC: 108 MMOL/L (ref 98–107)
CHOLEST SERPL-MCNC: 225 MG/DL (ref 0–199)
CHOLESTEROL/HDL RATIO: 3.7
CO2 SERPL-SCNC: 24 MMOL/L (ref 21–32)
CREAT SERPL-MCNC: 0.84 MG/DL (ref 0.5–1.05)
EGFRCR SERPLBLD CKD-EPI 2021: 78 ML/MIN/1.73M*2
EOSINOPHIL # BLD AUTO: 0.22 X10*3/UL (ref 0–0.7)
EOSINOPHIL NFR BLD AUTO: 3.3 %
ERYTHROCYTE [DISTWIDTH] IN BLOOD BY AUTOMATED COUNT: 14.1 % (ref 11.5–14.5)
EST. AVERAGE GLUCOSE BLD GHB EST-MCNC: 117 MG/DL
GLUCOSE SERPL-MCNC: 103 MG/DL (ref 74–99)
HBA1C MFR BLD: 5.7 %
HCT VFR BLD AUTO: 44 % (ref 36–46)
HDLC SERPL-MCNC: 60.6 MG/DL
HGB BLD-MCNC: 13.3 G/DL (ref 12–16)
IMM GRANULOCYTES # BLD AUTO: 0.02 X10*3/UL (ref 0–0.7)
IMM GRANULOCYTES NFR BLD AUTO: 0.3 % (ref 0–0.9)
LDLC SERPL CALC-MCNC: 152 MG/DL
LYMPHOCYTES # BLD AUTO: 1.95 X10*3/UL (ref 1.2–4.8)
LYMPHOCYTES NFR BLD AUTO: 29 %
MCH RBC QN AUTO: 28.3 PG (ref 26–34)
MCHC RBC AUTO-ENTMCNC: 30.2 G/DL (ref 32–36)
MCV RBC AUTO: 94 FL (ref 80–100)
MONOCYTES # BLD AUTO: 0.42 X10*3/UL (ref 0.1–1)
MONOCYTES NFR BLD AUTO: 6.2 %
NEUTROPHILS # BLD AUTO: 4.07 X10*3/UL (ref 1.2–7.7)
NEUTROPHILS NFR BLD AUTO: 60.5 %
NON HDL CHOLESTEROL: 164 MG/DL (ref 0–149)
NRBC BLD-RTO: 0 /100 WBCS (ref 0–0)
PLATELET # BLD AUTO: 276 X10*3/UL (ref 150–450)
POTASSIUM SERPL-SCNC: 4.3 MMOL/L (ref 3.5–5.3)
PROT SERPL-MCNC: 6.8 G/DL (ref 6.4–8.2)
RBC # BLD AUTO: 4.7 X10*6/UL (ref 4–5.2)
SODIUM SERPL-SCNC: 140 MMOL/L (ref 136–145)
TRIGL SERPL-MCNC: 64 MG/DL (ref 0–149)
TSH SERPL-ACNC: 0.72 MIU/L (ref 0.44–3.98)
VLDL: 13 MG/DL (ref 0–40)
WBC # BLD AUTO: 6.7 X10*3/UL (ref 4.4–11.3)

## 2024-05-21 PROCEDURE — 80053 COMPREHEN METABOLIC PANEL: CPT

## 2024-05-21 PROCEDURE — 83036 HEMOGLOBIN GLYCOSYLATED A1C: CPT

## 2024-05-21 PROCEDURE — 80061 LIPID PANEL: CPT

## 2024-05-21 PROCEDURE — 84443 ASSAY THYROID STIM HORMONE: CPT

## 2024-05-21 PROCEDURE — 36415 COLL VENOUS BLD VENIPUNCTURE: CPT

## 2024-05-21 PROCEDURE — 85025 COMPLETE CBC W/AUTO DIFF WBC: CPT

## 2024-05-31 ENCOUNTER — APPOINTMENT (OUTPATIENT)
Dept: PRIMARY CARE | Facility: CLINIC | Age: 64
End: 2024-05-31
Payer: COMMERCIAL

## 2024-06-03 ENCOUNTER — OFFICE VISIT (OUTPATIENT)
Dept: ENDOCRINOLOGY | Facility: CLINIC | Age: 64
End: 2024-06-03
Payer: COMMERCIAL

## 2024-06-03 VITALS
BODY MASS INDEX: 35.88 KG/M2 | SYSTOLIC BLOOD PRESSURE: 143 MMHG | HEIGHT: 59 IN | WEIGHT: 178 LBS | DIASTOLIC BLOOD PRESSURE: 71 MMHG

## 2024-06-03 DIAGNOSIS — E03.9 HYPOTHYROIDISM, UNSPECIFIED TYPE: Primary | ICD-10-CM

## 2024-06-03 DIAGNOSIS — R53.83 OTHER FATIGUE: ICD-10-CM

## 2024-06-03 RX ORDER — LEVOTHYROXINE SODIUM 100 UG/1
100 TABLET ORAL DAILY
Qty: 90 TABLET | Refills: 3 | Status: SHIPPED | OUTPATIENT
Start: 2024-06-03

## 2024-06-03 RX ORDER — LEVOTHYROXINE SODIUM 100 UG/1
100 TABLET ORAL DAILY
COMMUNITY
End: 2024-06-03 | Stop reason: SDUPTHER

## 2024-06-03 ASSESSMENT — ENCOUNTER SYMPTOMS
AGITATION: 0
DIARRHEA: 0
EYE ITCHING: 0
TROUBLE SWALLOWING: 0
FATIGUE: 1
VOMITING: 0
ABDOMINAL PAIN: 0
TREMORS: 0
PALPITATIONS: 0
ARTHRALGIAS: 0
LIGHT-HEADEDNESS: 0
SORE THROAT: 0
SHORTNESS OF BREATH: 0
VOICE CHANGE: 0
SLEEP DISTURBANCE: 0
ABDOMINAL DISTENTION: 0
FREQUENCY: 0
NERVOUS/ANXIOUS: 0
PHOTOPHOBIA: 0
HEADACHES: 0
CONSTIPATION: 0
CHEST TIGHTNESS: 0
NAUSEA: 0
DYSURIA: 0

## 2024-06-03 NOTE — PROGRESS NOTES
"Subjective   Patient ID: Caryn Feliz \"Dolly" is a 64 y.o. female who presents for Hypothyroidism (dx > 30 yrs ago (she thinks that she was told in the distant past that she has Hashimoto's thyroiditis)/PCP: Tashi/Failed: levothyroxine- lack of efficacy /Current regimen: Synthroid MATHIEU 100mcg every day; takes correctly /NO biotin/multivitamin/hair,skin, nail vitamin ).  Lab Results   Component Value Date    TSH 0.72 05/21/2024      HPI   See AP     Review of Systems   Constitutional:  Positive for fatigue.   HENT:  Negative for sore throat, trouble swallowing and voice change.    Eyes:  Negative for photophobia, itching and visual disturbance.   Respiratory:  Negative for chest tightness and shortness of breath.    Cardiovascular:  Negative for chest pain and palpitations.   Gastrointestinal:  Negative for abdominal distention, abdominal pain, constipation, diarrhea, nausea and vomiting.   Endocrine: Negative for cold intolerance, heat intolerance and polyuria.   Genitourinary:  Negative for dysuria and frequency.   Musculoskeletal:  Negative for arthralgias.   Skin:  Negative for pallor.   Allergic/Immunologic: Negative for environmental allergies.   Neurological:  Negative for tremors, light-headedness and headaches.   Psychiatric/Behavioral:  Negative for agitation and sleep disturbance. The patient is not nervous/anxious.        Objective   Physical Exam  Constitutional:       Appearance: Normal appearance.   HENT:      Head: Normocephalic.      Nose: Nose normal.      Mouth/Throat:      Mouth: Mucous membranes are moist.   Eyes:      Extraocular Movements: Extraocular movements intact.   Cardiovascular:      Rate and Rhythm: Normal rate.   Pulmonary:      Effort: Pulmonary effort is normal. No respiratory distress.   Abdominal:      General: There is no distension.   Musculoskeletal:         General: Normal range of motion.      Cervical back: Normal range of motion and neck supple.   Skin:     General: Skin " "is warm and dry.   Neurological:      Mental Status: She is alert and oriented to person, place, and time.   Psychiatric:         Mood and Affect: Mood normal.      Visit Vitals  /71   Ht 1.499 m (4' 11\")   Wt 80.7 kg (178 lb) Comment: wt from S appointment   BMI 35.95 kg/m²   Smoking Status Never   BSA 1.83 m²        Assessment/Plan   Diagnoses and all orders for this visit:  Hypothyroidism, unspecified type  -     Synthroid 100 mcg tablet; Take 1 tablet (100 mcg) by mouth early in the morning.. Take on an empty stomach at the same time each day, either 30 to 60 minutes prior to breakfast  Other fatigue  -     Referral to Adult Sleep Medicine; Future          62 y/o F with hypothyroidism dx > 30 yrs ago (she thinks that she was told in the distant past that she has Hashimoto's thyroiditis). Currently on replacement.     Pt is currently taking Synthroid 100mcg daily, which she takes correctly.   feels slightly fatigued more afternoons  Clinically and biochemically  euthyroid.      PLAN : TFT today    continue same dose    takes vit D3 - 5 K daily      Fatigue- jennifer[ected sleep apnea- will put referral to sleep medicine      RTC1 yr       SH- works as . no kids, one dog , 11 yr old ( brandon ) - passed away 2023   loves going to [employer]   mom was 102 yrs - passed in summer 2023  "

## 2024-06-11 ENCOUNTER — APPOINTMENT (OUTPATIENT)
Dept: RADIOLOGY | Facility: CLINIC | Age: 64
End: 2024-06-11
Payer: COMMERCIAL

## 2024-06-12 ENCOUNTER — APPOINTMENT (OUTPATIENT)
Dept: ORTHOPEDIC SURGERY | Facility: CLINIC | Age: 64
End: 2024-06-12
Payer: COMMERCIAL

## 2024-06-12 ENCOUNTER — HOSPITAL ENCOUNTER (OUTPATIENT)
Dept: RADIOLOGY | Facility: CLINIC | Age: 64
Discharge: HOME | End: 2024-06-12
Payer: COMMERCIAL

## 2024-06-12 ENCOUNTER — HOSPITAL ENCOUNTER (OUTPATIENT)
Dept: RADIOLOGY | Facility: CLINIC | Age: 64
End: 2024-06-12
Payer: COMMERCIAL

## 2024-06-12 VITALS — WEIGHT: 177.91 LBS | HEIGHT: 59 IN | BODY MASS INDEX: 35.87 KG/M2

## 2024-06-12 DIAGNOSIS — M25.561 LATERAL KNEE PAIN, RIGHT: ICD-10-CM

## 2024-06-12 DIAGNOSIS — E78.5 HYPERLIPIDEMIA, UNSPECIFIED HYPERLIPIDEMIA TYPE: ICD-10-CM

## 2024-06-12 DIAGNOSIS — Z12.31 SCREENING MAMMOGRAM FOR BREAST CANCER: ICD-10-CM

## 2024-06-12 PROCEDURE — 73562 X-RAY EXAM OF KNEE 3: CPT | Mod: RT

## 2024-06-12 PROCEDURE — 77067 SCR MAMMO BI INCL CAD: CPT

## 2024-06-12 PROCEDURE — 73562 X-RAY EXAM OF KNEE 3: CPT | Mod: RIGHT SIDE | Performed by: RADIOLOGY

## 2024-06-14 ENCOUNTER — OFFICE VISIT (OUTPATIENT)
Dept: ORTHOPEDIC SURGERY | Facility: CLINIC | Age: 64
End: 2024-06-14
Payer: COMMERCIAL

## 2024-06-14 DIAGNOSIS — M17.11 PRIMARY OSTEOARTHRITIS OF RIGHT KNEE: Primary | ICD-10-CM

## 2024-06-14 DIAGNOSIS — M25.561 ACUTE PAIN OF RIGHT KNEE: ICD-10-CM

## 2024-06-14 PROCEDURE — 99204 OFFICE O/P NEW MOD 45 MIN: CPT | Performed by: STUDENT IN AN ORGANIZED HEALTH CARE EDUCATION/TRAINING PROGRAM

## 2024-06-14 PROCEDURE — 99214 OFFICE O/P EST MOD 30 MIN: CPT | Performed by: STUDENT IN AN ORGANIZED HEALTH CARE EDUCATION/TRAINING PROGRAM

## 2024-06-14 ASSESSMENT — PAIN DESCRIPTION - DESCRIPTORS: DESCRIPTORS: ACHING;THROBBING

## 2024-06-14 ASSESSMENT — PAIN - FUNCTIONAL ASSESSMENT: PAIN_FUNCTIONAL_ASSESSMENT: 0-10

## 2024-06-14 ASSESSMENT — PAIN SCALES - GENERAL: PAINLEVEL_OUTOF10: 6

## 2024-06-14 NOTE — PROGRESS NOTES
Chela Licona MD   Adult Reconstruction and Joint Replacement Surgery  Phone: [phone]     Fax: [phone]     INITIAL CONSULTATION    Name: Caryn Feliz  : 1960  Date of Visit:  2024    CC: Right knee pain    Clinical History:  Caryn Feliz is a 64 y.o. female who presents with 1 months of RIGHT knee pain. They were referred by Dr. Akins.    Patient has tried the following Ice, NSAIDs, Tylenol (arthritis dosing) , Activity modification, and Xray. Date of last steroid injection: never. Patient does have pain at night. Patient is able to walk 2-3 blocks. Patient is currently using nothing as assistive device. Primarily complains of lateral  pain. Patient has difficulty with walking  and walking on unlevel surfaces . The pain is significantly impacting her ability to perform activities of daily living. Patient reports no longer able to do activities such as walk without pain.     PROMs   No questionnaires on file.     Past Medical History:   Diagnosis Date    Acute serous otitis media, right ear 2021    Non-recurrent acute serous otitis media of right ear    Elevated blood-pressure reading, without diagnosis of hypertension     Blood pressure elevated without history of HTN    Encounter for screening for other viral diseases     Screening for viral disease    Otalgia, right ear 2021    Right ear pain    Personal history of other diseases of the female genital tract     History of endometriosis    Personal history of other diseases of the female genital tract     History of postmenopausal bleeding    Personal history of other diseases of the respiratory system     History of acute bronchitis    Personal history of other infectious and parasitic diseases     History of herpes zoster    Unspecified abdominal pain     Right flank discomfort     Documented in chart and reviewed.     Past Surgical History:   Procedure Laterality Date    OTHER SURGICAL HISTORY  2021     Colonoscopy    OTHER SURGICAL HISTORY  11/19/2021    Exploratory laparoscopy-endometriosis    OTHER SURGICAL HISTORY  08/10/2022    Carpal tunnel surgery       Allergies: She has No Known Allergies.     Medications:  Current Outpatient Medications   Medication Instructions    cholecalciferol (VITAMIN D-3) 5,000 Units, oral, Daily    escitalopram (LEXAPRO) 20 mg, oral, Daily    hydrOXYzine HCL (ATARAX) 25 mg, oral, Nightly    Synthroid 100 mcg, oral, Daily, Take on an empty stomach at the same time each day, either 30 to 60 minutes prior to breakfast    vitamin E acetate (VITAMIN E ORAL) 1 capsule, oral, Daily       Family History   Problem Relation Name Age of Onset    Anxiety disorder Mother Manuela     Diabetes Mother Manuela     Hypertension Mother Manuela     Uterine cancer Mother Manuela     Coronary artery disease Father      Other (CABG) Father      Prostate cancer Father      Coronary artery disease Sister  68        stents/CABG    Other (hysterectomy) Sister       Documented in chart and reviewed.     Social History     Tobacco Use    Smoking status: Never     Passive exposure: Never    Smokeless tobacco: Never   Substance Use Topics    Alcohol use: Yes     Alcohol/week: 1.0 standard drink of alcohol     Types: 1 Standard drinks or equivalent per week     Comment: Once a month       Review of Systems: Review of systems completed with medical assistant intake. Please refer to this note.     Falls: The patient denies any recent falls or fall-related injuries.    Physical Exam:  BMI: 36, which is abnormal. Encouraged to lose weight and to follow up with PCP.    Constitutional: The patient is well-appearing and well groomed.     Neurological/Psychiatric: The patient is alert and oriented to person, place and time. The patient has a normal mood and affect.    Skin Examination: The skin over the right lower extremity, left lower extremity, right upper extremity, and left upper extremity is intact without any  evidence of infection or rash.    Cardiovascular Examination: There are no varicosities and the skin is normal temperature, capillary refill normal, arterial pulses normal, no edema.    Lymphatic Examination: There is no lymphatic swelling or palpable lymph nodes present around the involved joint.    Neurological Examination: Bilateral lower extremities are grossly neurologically intact.Sensation normal, motor function normal.    Gait: The patient ambulates with an antalgic gait.     Lumbar spine:    No tenderness to palpation midline.    Negative straight leg raise bilaterally.    Right Hip Examination:  The skin is intact over the hip.    There is no tenderness over the greater trochanter.    Range of motion is full extension to 100 degrees of flexion.    The hip is stable without subluxation or dislocation.    The hip internally rotates to 15 degrees and externally rotates to 45 degrees.    There is no pain with hip motion.    Left Hip Examination:  The skin is intact over the hip.    There is no tenderness over the greater trochanter.    Range of motion is full extension to 100 degrees of flexion.    The hip is stable without subluxation or dislocation.    The hip internally rotates to 15 degrees and externally rotates to 45 degrees.    There is no pain with hip motion.    Right Knee Examination:  Examination of the knee reveals the skin to be intact. Kan cyst in posterolateral knee.     There is a small effusion in the knee.    The alignment of the knee is Varus.    This deformity is correctable.    There is tenderness to palpation over the joint line.    There is significant quadriceps atrophy.    Range of Motion: full extension to 120 degrees of flexion.    The knee is stable to varus-valgus stress and anterior-posterior stress.     There is mild grinding with range of motion.    There is mild patellofemoral crepitus.    Left Knee Examination:  Examination of the knee reveals the skin to be intact.  "    There is no obvious swelling.    There is a no effusion in the knee.     The alignment of the knee is normal.    There is no tenderness to palpation over the joint line.    There is no significant quadriceps atrophy.    Range of motion is full extension to 120 degrees of flexion.    The knee is stable to varus-valgus stress and anterior-posterior stress.     There is no grinding with range of motion.    There is no patellofemoral crepitus.    Prior Labs:   Lab Results   Component Value Date    WBC 6.7 05/21/2024    HGB 13.3 05/21/2024    HCT 44.0 05/21/2024    MCV 94 05/21/2024     05/21/2024      No results found for: \"INR\", \"PROTIME\"      Lab Results   Component Value Date    GLUCOSE 103 (H) 05/21/2024    CALCIUM 8.9 05/21/2024     05/21/2024    K 4.3 05/21/2024    CO2 24 05/21/2024     (H) 05/21/2024    BUN 16 05/21/2024    CREATININE 0.84 05/21/2024      No results found for: \"CKTOTAL\", \"CKMB\", \"CKMBINDEX\", \"TROPONINI\"   Lab Results   Component Value Date    HGBA1C 5.7 (H) 05/21/2024         No results found for: \"CRP\"   No results found for: \"SEDRATE\"     Radiographs:  Radiographs were personally reviewed today. There is evidence of mild  RIGHT  knee osteoarthritis without bone on bone apposition.    Impression:  64 y.o. female presents with mild  RIGHT  knee osteoarthritis without bone on bone apposition. The patient is not a candidate for surgery at this time.    Diagnosis:  Acute pain of right knee    Recommendations / Plan:    I have discussed the options in detail with the patient. We have discussed anti-inflammatory medication, activity modification, physical therapy, corticosteroid injections, viscosupplementation injections, partial knee replacement surgery and total knee replacement surgery. The patient has not yet exhausted all conservative treatment measures.    The risks and benefits of all these treatment options have been discussed in detail.     The patient has tried at " least 3 months of the above conservative treatments and continues to have disabling pain, impaired activities of daily living and worsened quality of life.  Reviewed the surgical optimization steps to optimize their chances for a successful joint replacement surgery.      Currently their BMI is 36.  Discussed that obesity is a risk factor for continued progression of osteoarthritis. Each pound of weight loss offloads their hip and knee joints by 3-6 pounds.  The most effective of these options is weight loss mainly through restricting caloric intake.  A referral to a nutritionist was offered.    A physical therapy prescription was ordered for the patient.  Patient will continue their home exercise program. Strategies for pain management using over-the-counter anti-inflammatory medications reviewed.  Patient defers steroid injection today.  Discussed the potential use of knee sleeve or brace.  Encouraged them to maintain range of motion and strength around the knee joints.  They will continue to implement these strategies in addressing their pain.       Recommend the patient continue optimizing nonsurgical treatment interventions as outlined above for management of their knee arthritis.  I would be happy to see them again at any point to discuss surgery if they are more optimized or to review progress with nonsurgical treatment of arthritis. The patient verbalizes understanding with the recommendations and treatment plan as outlined above and is in agreement.  Questions were addressed.    _____________  Chela Licona MD   Attending Orthopaedic Surgeon  [facility]    [facility]    Approximately 45 minutes were spent on the following tasks:              Preparing for the patient              Reviewing medical records              Taking a patient history              Performing a physical exam              Reviewing treatment options with the patient               Explaining the risks, potential benefits, and alternative to surgery  Explaining the expected rehabilitation after each treatment option  Explaining the potential long term expectations  Evaluating the diagnostic imaging     This office note was transcribed with dictation software.  Please excuse any typographical errors, program misunderstandings leading to inadvertent insertions or deletions of inappropriate wording, pronoun errors and other unintentional transcription errors not noticed on proof-reading.

## 2024-06-19 ENCOUNTER — APPOINTMENT (OUTPATIENT)
Dept: PRIMARY CARE | Facility: CLINIC | Age: 64
End: 2024-06-19
Payer: COMMERCIAL

## 2024-06-21 ENCOUNTER — APPOINTMENT (OUTPATIENT)
Dept: ORTHOPEDIC SURGERY | Facility: CLINIC | Age: 64
End: 2024-06-21
Payer: COMMERCIAL

## 2024-06-28 ENCOUNTER — APPOINTMENT (OUTPATIENT)
Dept: PRIMARY CARE | Facility: CLINIC | Age: 64
End: 2024-06-28
Payer: COMMERCIAL

## 2024-07-17 ENCOUNTER — OFFICE VISIT (OUTPATIENT)
Dept: ORTHOPEDIC SURGERY | Facility: CLINIC | Age: 64
End: 2024-07-17
Payer: COMMERCIAL

## 2024-07-17 DIAGNOSIS — M23.91 INTERNAL DERANGEMENT OF RIGHT KNEE: Primary | ICD-10-CM

## 2024-07-17 DIAGNOSIS — S83.91XA SPRAIN OF RIGHT KNEE, UNSPECIFIED LIGAMENT, INITIAL ENCOUNTER: ICD-10-CM

## 2024-07-17 PROCEDURE — 99213 OFFICE O/P EST LOW 20 MIN: CPT | Performed by: INTERNAL MEDICINE

## 2024-07-17 PROCEDURE — 99203 OFFICE O/P NEW LOW 30 MIN: CPT | Performed by: INTERNAL MEDICINE

## 2024-07-17 PROCEDURE — L1812 KO ELASTIC W/JOINTS PRE OTS: HCPCS | Performed by: INTERNAL MEDICINE

## 2024-07-17 NOTE — PROGRESS NOTES
"  Acute Injury New Patient Visit    CC:   Chief Complaint   Patient presents with    Right Knee - Pain     Rt knee twisted 7/13/24  Xrays at         HPI: Caryn \"Dolly" is a 64 y.o. female presents today for evaluation for acute on chronic right knee injury sustained after she twisted her right knee four days ago. She notes that she has had right knee pain and instability for about two months. She had x-rays taken at the urgent care. She is here for initial evaluation.         Review of Systems   GENERAL: Negative for malaise, significant weight loss, fever  MUSCULOSKELETAL: See HPI  NEURO:  Negative for numbness / tingling     Past Medical History  Past Medical History:   Diagnosis Date    Acute serous otitis media, right ear 11/19/2021    Non-recurrent acute serous otitis media of right ear    Elevated blood-pressure reading, without diagnosis of hypertension     Blood pressure elevated without history of HTN    Encounter for screening for other viral diseases     Screening for viral disease    Otalgia, right ear 11/19/2021    Right ear pain    Personal history of other diseases of the female genital tract     History of endometriosis    Personal history of other diseases of the female genital tract     History of postmenopausal bleeding    Personal history of other diseases of the respiratory system     History of acute bronchitis    Personal history of other infectious and parasitic diseases     History of herpes zoster    Unspecified abdominal pain     Right flank discomfort       Medication review  Medication Documentation Review Audit       Reviewed by Kat Leonardo LPN (Licensed Nurse) on 06/14/24 at 1133      Medication Order Taking? Sig Documenting Provider Last Dose Status   cholecalciferol (Vitamin D-3) 5,000 Units tablet [id number] Yes Take 1 tablet (5,000 Units) by mouth once daily. Historical Provider, MD Taking Active   escitalopram (Lexapro) 20 mg tablet [id number] Yes Take 1 tablet (20 mg) by mouth " once daily. Pearl Akins MD Taking Active   hydrOXYzine HCL (Atarax) 25 mg tablet [id number] Yes Take 1 tablet (25 mg) by mouth once daily at bedtime. Pearl Akins MD Taking Active   Synthroid 100 mcg tablet [id number] Yes Take 1 tablet (100 mcg) by mouth early in the morning.. Take on an empty stomach at the same time each day, either 30 to 60 minutes prior to breakfast Lora Rod MD Taking Active   vitamin E acetate (VITAMIN E ORAL) [id number] Yes Take 1 capsule by mouth once daily. Historical Provider, MD Taking Active                    Allergies  No Known Allergies    Social History  Social History     Socioeconomic History    Marital status: Single     Spouse name: Not on file    Number of children: Not on file    Years of education: Not on file    Highest education level: Not on file   Occupational History    Not on file   Tobacco Use    Smoking status: Never     Passive exposure: Never    Smokeless tobacco: Never   Vaping Use    Vaping status: Never Used   Substance and Sexual Activity    Alcohol use: Yes     Alcohol/week: 1.0 standard drink of alcohol     Types: 1 Standard drinks or equivalent per week     Comment: Once a month    Drug use: Never    Sexual activity: Not Currently     Partners: Choose not to disclose     Birth control/protection: None   Other Topics Concern    Not on file   Social History Narrative    Not on file     Social Determinants of Health     Financial Resource Strain: Not on file   Food Insecurity: Not on file   Transportation Needs: Not on file   Physical Activity: Not on file   Stress: Not on file   Social Connections: Not on file   Intimate Partner Violence: Not on file   Housing Stability: Not on file       Surgical History  Past Surgical History:   Procedure Laterality Date    OTHER SURGICAL HISTORY  11/19/2021    Colonoscopy    OTHER SURGICAL HISTORY  11/19/2021    Exploratory laparoscopy-endometriosis    OTHER SURGICAL HISTORY  08/10/2022    Carpal tunnel  "surgery       Physical Exam:  GENERAL:  Patient is awake, alert, and oriented to person place and time.  Patient appears well nourished and well kept.  Affect Calm, Not Acutely Distressed.  HEENT:  Normocephalic, Atraumatic, EOMI  CARDIOVASCULAR:  Hemodynamically stable.  RESPIRATORY:  Normal respirations with unlabored breathing.  Extremity: Right knee examination shows skin is intact.  There is no erythema or warmth.  Trace amount of effusion.  Can flex the right knee to 130 degrees.  Full extension at 0 degrees.  No pain over the medial joint line.  Pain over the lateral joint line.  There is no pain over the patellar or quadricep tendon.  No pain over the proximal tibia.  No pain over the popliteal fossa.  Negative valgus stress test.  Positive varus stress test with slight instability.  Negative Peter's test medially with no instability.  Positive Peter's test laterally with instability.  Negative Lachman's test.  Patellar and quadricep mechanism intact.  Negative anterior and posterior drawer test.  Negative patellar apprehension test.  Distal pulses are palpable, neurovascularly intact.  Walking with no significant antalgic gait.      Diagnostics: X-rays reviewed      Procedure: None    Assessment:   Right knee sprain  Right knee internal derangement    Plan: Caryn \"Sosa\" presents today for initial evaluation for acute right knee injury sustained four days ago after she twisted her right knee. X-rays showed no obvious fractures.  She is having persistent pain, swelling with instability, there are clinical concerns for a lateral meniscal tear, we recommended MRI of the right knee for preoperative planning and a hinge knee brace for support.  We did offer an intra-articular corticosteroid injection today but she declined, she will follow-up with me or her orthopedic surgeon after the MRI.     No orders of the defined types were placed in this encounter.     At the conclusion of the visit there were no " further questions by the patient/family regarding their plan of care.  Patient was instructed to call or return with any issues, questions, or concerns regarding their injury and/or treatment plan described above.     07/17/24 at 2:36 PM - Alexis Franco MD  Scribe Attestation  By signing my name below, I, Gerardo Blas, Scribe   attest that this documentation has been prepared under the direction and in the presence of Alexis Franco MD.    Office: [phone]    This note was prepared using voice recognition software.  The details of this note are correct and have been reviewed, and corrected to the best of my ability.  Some grammatical errors may persist related to the Dragon software.

## 2024-07-26 ENCOUNTER — APPOINTMENT (OUTPATIENT)
Dept: ORTHOPEDIC SURGERY | Facility: CLINIC | Age: 64
End: 2024-07-26
Payer: COMMERCIAL

## 2024-07-31 ENCOUNTER — HOSPITAL ENCOUNTER (OUTPATIENT)
Dept: RADIOLOGY | Facility: CLINIC | Age: 64
End: 2024-07-31
Payer: COMMERCIAL

## 2024-10-16 ENCOUNTER — APPOINTMENT (OUTPATIENT)
Dept: PRIMARY CARE | Facility: CLINIC | Age: 64
End: 2024-10-16
Payer: COMMERCIAL

## 2025-01-24 ENCOUNTER — APPOINTMENT (OUTPATIENT)
Dept: PRIMARY CARE | Facility: CLINIC | Age: 65
End: 2025-01-24
Payer: COMMERCIAL

## 2025-01-24 VITALS
BODY MASS INDEX: 34.68 KG/M2 | DIASTOLIC BLOOD PRESSURE: 88 MMHG | TEMPERATURE: 96.9 F | HEIGHT: 59 IN | WEIGHT: 172 LBS | HEART RATE: 65 BPM | SYSTOLIC BLOOD PRESSURE: 136 MMHG

## 2025-01-24 DIAGNOSIS — R73.09 ELEVATED HEMOGLOBIN A1C: ICD-10-CM

## 2025-01-24 DIAGNOSIS — F51.04 PSYCHOPHYSIOLOGICAL INSOMNIA: ICD-10-CM

## 2025-01-24 DIAGNOSIS — Z11.59 NEED FOR HEPATITIS C SCREENING TEST: ICD-10-CM

## 2025-01-24 DIAGNOSIS — Z13.1 DIABETES MELLITUS SCREENING: ICD-10-CM

## 2025-01-24 DIAGNOSIS — Z23 NEED FOR VACCINATION: ICD-10-CM

## 2025-01-24 DIAGNOSIS — Z12.31 ENCOUNTER FOR SCREENING MAMMOGRAM FOR BREAST CANCER: ICD-10-CM

## 2025-01-24 DIAGNOSIS — Z78.0 ASYMPTOMATIC MENOPAUSAL STATE: ICD-10-CM

## 2025-01-24 DIAGNOSIS — E66.09 CLASS 1 OBESITY DUE TO EXCESS CALORIES WITHOUT SERIOUS COMORBIDITY WITH BODY MASS INDEX (BMI) OF 34.0 TO 34.9 IN ADULT: ICD-10-CM

## 2025-01-24 DIAGNOSIS — Z13.6 SCREENING FOR CARDIOVASCULAR CONDITION: ICD-10-CM

## 2025-01-24 DIAGNOSIS — I10 STAGE 1 HYPERTENSION: ICD-10-CM

## 2025-01-24 DIAGNOSIS — F41.9 ANXIETY: ICD-10-CM

## 2025-01-24 DIAGNOSIS — Z00.00 ROUTINE GENERAL MEDICAL EXAMINATION AT HEALTH CARE FACILITY: Primary | ICD-10-CM

## 2025-01-24 DIAGNOSIS — E03.9 HYPOTHYROIDISM, UNSPECIFIED TYPE: ICD-10-CM

## 2025-01-24 DIAGNOSIS — E78.2 MIXED HYPERLIPIDEMIA: ICD-10-CM

## 2025-01-24 DIAGNOSIS — E66.811 CLASS 1 OBESITY DUE TO EXCESS CALORIES WITHOUT SERIOUS COMORBIDITY WITH BODY MASS INDEX (BMI) OF 34.0 TO 34.9 IN ADULT: ICD-10-CM

## 2025-01-24 RX ORDER — ESCITALOPRAM OXALATE 20 MG/1
20 TABLET ORAL DAILY
Qty: 90 TABLET | Refills: 1 | Status: SHIPPED | OUTPATIENT
Start: 2025-01-24

## 2025-01-24 RX ORDER — HYDROXYZINE HYDROCHLORIDE 25 MG/1
25 TABLET, FILM COATED ORAL NIGHTLY
Qty: 90 TABLET | Refills: 1 | Status: SHIPPED | OUTPATIENT
Start: 2025-01-24

## 2025-01-24 ASSESSMENT — ACTIVITIES OF DAILY LIVING (ADL)
DRESSING: INDEPENDENT
BATHING: INDEPENDENT
GROCERY_SHOPPING: INDEPENDENT
TAKING_MEDICATION: INDEPENDENT
DOING_HOUSEWORK: INDEPENDENT
MANAGING_FINANCES: INDEPENDENT

## 2025-01-24 ASSESSMENT — VISUAL ACUITY
OS_CC: 20/20
OD_CC: 20/20

## 2025-01-24 ASSESSMENT — PATIENT HEALTH QUESTIONNAIRE - PHQ9
SUM OF ALL RESPONSES TO PHQ9 QUESTIONS 1 AND 2: 0
2. FEELING DOWN, DEPRESSED OR HOPELESS: NOT AT ALL
1. LITTLE INTEREST OR PLEASURE IN DOING THINGS: NOT AT ALL

## 2025-01-24 NOTE — PROGRESS NOTES
Subjective   Patient ID: Sosa Feliz is a 65 y.o. female who presents for Welcome To Medicare (Welcome to medicare awv. ).  Medicare Wellness Billing Compliance Satisfied    *This is a visual tool to show completion of required items on the day of the visit. Green checks will only appear on the date of visit.    Review all medications by prescribing practitioner or clinical pharmacist (such as prescriptions, OTCs, herbal therapies and supplements) documented in the medical record    Past Medical, Surgical, and Family History reviewed and updated in chart    Tobacco Use Reviewed    Alcohol Use Reviewed    Illicit Drug Use Reviewed    PHQ2/9    Falls in Last Year Reviewed    Home Safety Risk Factors Reviewed    Cognitive Impairment Reviewed    Patient Self Assessment and Health Status    Current Diet Reviewed    Exercise Frequency    ADL - Hearing Impairment    ADL - Bathing    ADL - Dressing    ADL - Walks in Home    IADL - Managing Finances    IADL - Grocery Shopping    IADL - Taking Medications    IADL - Doing Housework    Vision Screening    Medicare Wellness Billing Compliance Satisfied    *This is a visual tool to show completion of required items on the day of the visit. Green checks will only appear on the date of visit.    Review all medications by prescribing practitioner or clinical pharmacist (such as prescriptions, OTCs, herbal therapies and supplements) documented in the medical record    Past Medical, Surgical, and Family History reviewed and updated in chart    Tobacco Use Reviewed    Alcohol Use Reviewed    Illicit Drug Use Reviewed    PHQ2/9    Falls in Last Year Reviewed    Home Safety Risk Factors Reviewed    Cognitive Impairment Reviewed    Patient Self Assessment and Health Status    Current Diet Reviewed    Exercise Frequency    ADL - Hearing Impairment    ADL - Bathing    ADL - Dressing    ADL - Walks in Home    IADL - Managing Finances    IADL - Grocery  "Shopping    IADL - Taking Medications    IADL - Doing Housework      HPI   Here for welcome to Medicare visit and medication follow-up  Nutrition: needs improvement; too many meals away from home/too many snack foods. Decaf coffee and water  Exercise:  walks dog.  No strength or systems training  Sleep: Using hydroxyzine to help alleviate anxiety which impacts sleep onset  Eye: utd, early cataracts  Dental:  utd    Elev bp-no recent BP checks at home.  Hypothyroidism-stable on current dose of Synthroid.  Continues to follow with endocrinology  Anxiety- stable on Lexapro, using hydroxyzine most nights    Review of Systems   Constitutional:  Negative for fatigue and unexpected weight change.   Respiratory:  Negative for chest tightness and shortness of breath.    Cardiovascular:  Negative for chest pain and leg swelling.   Gastrointestinal:  Negative for abdominal pain, constipation and diarrhea.   Neurological:  Negative for dizziness, light-headedness and headaches.   Psychiatric/Behavioral:  Negative for dysphoric mood.        Objective   BP (!) 152/91 (BP Location: Right arm, Patient Position: Sitting)   Pulse 65   Temp 36.1 °C (96.9 °F)   Ht 1.499 m (4' 11\")   Wt 78 kg (172 lb)   BMI 34.74 kg/m²     Physical Exam  Vitals reviewed.   Constitutional:       General: She is not in acute distress.     Appearance: Normal appearance. She is obese.   HENT:      Head: Normocephalic and atraumatic.      Right Ear: Tympanic membrane and ear canal normal.      Left Ear: Tympanic membrane and ear canal normal.      Nose: Nose normal. No congestion or rhinorrhea.      Mouth/Throat:      Mouth: Mucous membranes are moist.      Pharynx: Oropharynx is clear.   Eyes:      Extraocular Movements: Extraocular movements intact.      Conjunctiva/sclera: Conjunctivae normal.      Pupils: Pupils are equal, round, and reactive to light.   Cardiovascular:      Rate and Rhythm: Normal rate and regular rhythm.      Pulses: Normal " pulses.      Heart sounds: Normal heart sounds. No murmur heard.  Pulmonary:      Effort: Pulmonary effort is normal. No respiratory distress.      Breath sounds: Normal breath sounds.   Abdominal:      General: Abdomen is flat. Bowel sounds are normal.      Palpations: Abdomen is soft.      Tenderness: There is no abdominal tenderness.   Musculoskeletal:         General: Normal range of motion.      Cervical back: Normal range of motion and neck supple.   Lymphadenopathy:      Cervical: No cervical adenopathy.   Skin:     General: Skin is warm and dry.   Neurological:      General: No focal deficit present.      Mental Status: She is alert and oriented to person, place, and time.   Psychiatric:         Mood and Affect: Mood normal.         Thought Content: Thought content normal.         Assessment/Plan   Assessment & Plan  Routine general medical examination at health care facility  Counseled regarding strategies to improve diet choices and increase physical activity.  She declined referral to nutrition at present.  Wishes to return for future visit for cervical cancer screening  Orders:    1 Year Follow Up In Primary Care - Wellness Exam; Future    Class 1 obesity due to excess calories without serious comorbidity with body mass index (BMI) of 34.0 to 34.9 in adult    Orders:    Hemoglobin A1c; Future    Diabetes mellitus screening    Orders:    Urine Microalbumin - Lab collect; Future    Hemoglobin A1c; Future    Screening for cardiovascular condition    Orders:    ECG 12 Lead    Need for vaccination  Reviewed recommended vaccines.  Orders:    Pneumococcal vaccine 20-valent; Future    diphth,pertus,acell,,tetanus (BoostRIX) 2.5-8-5 Lf-mcg-Lf/0.5mL injection; Inject 0.5 mL into the muscle 1 time for 1 dose.    Asymptomatic menopausal state  Proceed with bone density testing to assess for osteoporosis risk  Orders:    XR DEXA bone density; Future    Encounter for screening mammogram for breast cancer  Proceed with  screening mammogram  Orders:    BI mammo bilateral screening tomosynthesis; Future    Need for hepatitis C screening test    Orders:    Hepatitis C Antibody; Future    Mixed hyperlipidemia    Orders:    Lipid panel; Future    Hypothyroidism, unspecified type  Continue current dose of levothyroxine.  Adjust according to TSH results  Orders:    TSH with reflex to Free T4 if abnormal; Future    Psychophysiological insomnia  Continue hydroxyzine  Orders:    hydrOXYzine HCL (Atarax) 25 mg tablet; Take 1 tablet (25 mg) by mouth once daily at bedtime.    Anxiety  Continue Lexapro at current dose  Orders:    escitalopram (Lexapro) 20 mg tablet; Take 1 tablet (20 mg) by mouth once daily.    hydrOXYzine HCL (Atarax) 25 mg tablet; Take 1 tablet (25 mg) by mouth once daily at bedtime.    Stage 1 hypertension  Blood pressure measurements today consistent with stage I hypertension.  Reviewed lifestyle modifications to improve blood pressure.  Check and monitor blood pressure routinely at home.  Orders:    CBC and Auto Differential; Future    Comprehensive metabolic panel; Future    Elevated hemoglobin A1c    Orders:    Hemoglobin A1c; Future

## 2025-01-25 ASSESSMENT — ENCOUNTER SYMPTOMS
FATIGUE: 0
HEADACHES: 0
UNEXPECTED WEIGHT CHANGE: 0
DYSPHORIC MOOD: 0
ABDOMINAL PAIN: 0
CHEST TIGHTNESS: 0
DIZZINESS: 0
CONSTIPATION: 0
LIGHT-HEADEDNESS: 0
SHORTNESS OF BREATH: 0
DIARRHEA: 0

## 2025-01-25 NOTE — ASSESSMENT & PLAN NOTE
Continue Lexapro at current dose  Orders:    escitalopram (Lexapro) 20 mg tablet; Take 1 tablet (20 mg) by mouth once daily.    hydrOXYzine HCL (Atarax) 25 mg tablet; Take 1 tablet (25 mg) by mouth once daily at bedtime.

## 2025-01-25 NOTE — ASSESSMENT & PLAN NOTE
Continue current dose of levothyroxine.  Adjust according to TSH results  Orders:    TSH with reflex to Free T4 if abnormal; Future

## 2025-01-25 NOTE — ASSESSMENT & PLAN NOTE
Continue hydroxyzine  Orders:    hydrOXYzine HCL (Atarax) 25 mg tablet; Take 1 tablet (25 mg) by mouth once daily at bedtime.

## 2025-01-27 ENCOUNTER — PATIENT MESSAGE (OUTPATIENT)
Dept: PRIMARY CARE | Facility: CLINIC | Age: 65
End: 2025-01-27
Payer: MEDICARE

## 2025-06-03 ENCOUNTER — APPOINTMENT (OUTPATIENT)
Dept: ENDOCRINOLOGY | Facility: CLINIC | Age: 65
End: 2025-06-03
Payer: COMMERCIAL

## 2025-06-10 ENCOUNTER — APPOINTMENT (OUTPATIENT)
Dept: ENDOCRINOLOGY | Facility: CLINIC | Age: 65
End: 2025-06-10
Payer: COMMERCIAL

## 2025-06-10 VITALS
BODY MASS INDEX: 35.28 KG/M2 | HEIGHT: 59 IN | DIASTOLIC BLOOD PRESSURE: 86 MMHG | WEIGHT: 175 LBS | SYSTOLIC BLOOD PRESSURE: 142 MMHG

## 2025-06-10 DIAGNOSIS — E03.9 HYPOTHYROIDISM, UNSPECIFIED TYPE: Primary | ICD-10-CM

## 2025-06-10 DIAGNOSIS — E66.812 OBESITY, CLASS II, BMI 35-39.9: ICD-10-CM

## 2025-06-10 PROCEDURE — 3008F BODY MASS INDEX DOCD: CPT | Performed by: HOSPITALIST

## 2025-06-10 PROCEDURE — 1159F MED LIST DOCD IN RCRD: CPT | Performed by: HOSPITALIST

## 2025-06-10 PROCEDURE — 99214 OFFICE O/P EST MOD 30 MIN: CPT | Performed by: HOSPITALIST

## 2025-06-10 ASSESSMENT — ENCOUNTER SYMPTOMS
DYSURIA: 0
PHOTOPHOBIA: 0
TREMORS: 0
NAUSEA: 0
ABDOMINAL PAIN: 0
FATIGUE: 1
TROUBLE SWALLOWING: 0
FREQUENCY: 0
AGITATION: 0
VOMITING: 0
NERVOUS/ANXIOUS: 0
DIARRHEA: 0
VOICE CHANGE: 0
EYE ITCHING: 0
HEADACHES: 0
CHEST TIGHTNESS: 0
LIGHT-HEADEDNESS: 0
ARTHRALGIAS: 0
SORE THROAT: 0
CONSTIPATION: 0
ABDOMINAL DISTENTION: 0
SHORTNESS OF BREATH: 0
PALPITATIONS: 0
SLEEP DISTURBANCE: 0

## 2025-06-10 NOTE — PROGRESS NOTES
"Subjective   Patient ID:     Patient ID: Caryn Feliz \"Dolly" is a 64 y.o. female who presents for Hypothyroidism (dx > 30 yrs ago (she thinks that she was told in the distant past that she has Hashimoto's thyroiditis)/PCP: [facility]/Failed: levothyroxine- lack of efficacy /Current regimen: Synthroid MATHIEU 100mcg every day; takes correctly /NO biotin/multivitamin/hair,skin, nail vitamin ).  Lab Results   Component Value Date    TSH 0.72 05/21/2024      HPI   See AP     Review of Systems   Constitutional:  Positive for fatigue.   HENT:  Negative for sore throat, trouble swallowing and voice change.    Eyes:  Negative for photophobia, itching and visual disturbance.   Respiratory:  Negative for chest tightness and shortness of breath.    Cardiovascular:  Negative for chest pain and palpitations.   Gastrointestinal:  Negative for abdominal distention, abdominal pain, constipation, diarrhea, nausea and vomiting.   Endocrine: Negative for cold intolerance, heat intolerance and polyuria.   Genitourinary:  Negative for dysuria and frequency.   Musculoskeletal:  Negative for arthralgias.   Skin:  Negative for pallor.   Allergic/Immunologic: Negative for environmental allergies.   Neurological:  Negative for tremors, light-headedness and headaches.   Psychiatric/Behavioral:  Negative for agitation and sleep disturbance. The patient is not nervous/anxious.        Objective   Physical Exam  Constitutional:       Appearance: Normal appearance.   HENT:      Head: Normocephalic.      Nose: Nose normal.      Mouth/Throat:      Mouth: Mucous membranes are moist.   Eyes:      Extraocular Movements: Extraocular movements intact.   Cardiovascular:      Rate and Rhythm: Normal rate.   Pulmonary:      Effort: Pulmonary effort is normal. No respiratory distress.   Abdominal:      General: There is no distension.   Musculoskeletal:         General: Normal range of motion.      Cervical back: Normal range of motion and neck supple.   Skin:   " "  General: Skin is warm and dry.   Neurological:      Mental Status: She is alert and oriented to person, place, and time.   Psychiatric:         Mood and Affect: Mood normal.    Visit Vitals  /86   Ht 1.499 m (4' 11\")   Wt 79.4 kg (175 lb)   BMI 35.35 kg/m²   OB Status Postmenopausal   Smoking Status Never   BSA 1.82 m²        Assessment/Plan   Diagnoses and all orders for this visit:  Hypothyroidism, unspecified type  -     Synthroid 100 mcg tablet; Take 1 tablet (100 mcg) by mouth early in the morning.. Take on an empty stomach at the same time each day, either 30 to 60 minutes prior to breakfast  Other fatigue  -     Referral to Adult Sleep Medicine; Future          66 y/o F with hypothyroidism dx > 30 yrs ago (she thinks that she was told in the distant past that she has Hashimoto's thyroiditis). Currently on replacement.     Pt is currently taking Synthroid 100mcg daily, which she takes correctly.   feels slightly fatigued   Clinically euthyroid.       Started strength training- [employer]   Started yoga 2 days a week    PLAN : TFT today    continue same dose     vit D3 - 5 K daily      Fatigue-has not done sleep study yet     RTC1 yr       SH- works as . no kids, one dog , 11 yr old ( brandon ) - passed away 2023   New dog. She is  1 yr- Gosia - goes to [employer]       Started strength training- [employer]   Started yoga 2 days a wee  loves going to [employer]   mom was 102 yrs - passed in summer 2023  " yes

## 2025-06-18 ENCOUNTER — APPOINTMENT (OUTPATIENT)
Dept: PRIMARY CARE | Facility: CLINIC | Age: 65
End: 2025-06-18
Payer: MEDICARE

## 2025-06-20 ENCOUNTER — HOSPITAL ENCOUNTER (OUTPATIENT)
Dept: RADIOLOGY | Facility: CLINIC | Age: 65
Discharge: HOME | End: 2025-06-20
Payer: MEDICARE

## 2025-06-20 VITALS — WEIGHT: 175 LBS | BODY MASS INDEX: 35.28 KG/M2 | HEIGHT: 59 IN

## 2025-06-20 DIAGNOSIS — Z78.0 ASYMPTOMATIC MENOPAUSAL STATE: ICD-10-CM

## 2025-06-20 DIAGNOSIS — Z12.31 ENCOUNTER FOR SCREENING MAMMOGRAM FOR BREAST CANCER: ICD-10-CM

## 2025-06-20 PROCEDURE — 77080 DXA BONE DENSITY AXIAL: CPT

## 2025-06-20 PROCEDURE — 77063 BREAST TOMOSYNTHESIS BI: CPT

## 2025-06-23 DIAGNOSIS — E03.9 HYPOTHYROIDISM, UNSPECIFIED TYPE: ICD-10-CM

## 2025-06-23 RX ORDER — LEVOTHYROXINE SODIUM 100 UG/1
TABLET ORAL
Qty: 90 TABLET | Refills: 0 | Status: SHIPPED | OUTPATIENT
Start: 2025-06-23

## 2025-06-24 DIAGNOSIS — R92.8 ABNORMAL MAMMOGRAM: Primary | ICD-10-CM

## 2025-07-16 ENCOUNTER — HOSPITAL ENCOUNTER (OUTPATIENT)
Dept: RADIOLOGY | Facility: CLINIC | Age: 65
Discharge: HOME | End: 2025-07-16
Payer: MEDICARE

## 2025-07-16 DIAGNOSIS — R92.8 ABNORMAL MAMMOGRAM: ICD-10-CM

## 2025-07-16 PROCEDURE — 77065 DX MAMMO INCL CAD UNI: CPT | Mod: LT

## 2025-07-16 PROCEDURE — 77061 BREAST TOMOSYNTHESIS UNI: CPT | Mod: LEFT SIDE | Performed by: STUDENT IN AN ORGANIZED HEALTH CARE EDUCATION/TRAINING PROGRAM

## 2025-07-16 PROCEDURE — 77065 DX MAMMO INCL CAD UNI: CPT | Mod: LEFT SIDE | Performed by: STUDENT IN AN ORGANIZED HEALTH CARE EDUCATION/TRAINING PROGRAM

## 2025-07-24 LAB
ALBUMIN SERPL-MCNC: 4.1 G/DL (ref 3.6–5.1)
ALBUMIN/CREAT UR: 1 MG/G CREAT
ALP SERPL-CCNC: 80 U/L (ref 37–153)
ALT SERPL-CCNC: 11 U/L (ref 6–29)
ANION GAP SERPL CALCULATED.4IONS-SCNC: 8 MMOL/L (CALC) (ref 7–17)
AST SERPL-CCNC: 17 U/L (ref 10–35)
BASOPHILS # BLD AUTO: 38 CELLS/UL (ref 0–200)
BASOPHILS NFR BLD AUTO: 0.6 %
BILIRUB SERPL-MCNC: 0.8 MG/DL (ref 0.2–1.2)
BUN SERPL-MCNC: 22 MG/DL (ref 7–25)
CALCIUM SERPL-MCNC: 9 MG/DL (ref 8.6–10.4)
CHLORIDE SERPL-SCNC: 108 MMOL/L (ref 98–110)
CHOLEST SERPL-MCNC: 226 MG/DL
CHOLEST/HDLC SERPL: 3.9 (CALC)
CO2 SERPL-SCNC: 24 MMOL/L (ref 20–32)
CREAT SERPL-MCNC: 0.85 MG/DL (ref 0.5–1.05)
CREAT UR-MCNC: 204 MG/DL (ref 20–275)
EGFRCR SERPLBLD CKD-EPI 2021: 76 ML/MIN/1.73M2
EOSINOPHIL # BLD AUTO: 271 CELLS/UL (ref 15–500)
EOSINOPHIL NFR BLD AUTO: 4.3 %
ERYTHROCYTE [DISTWIDTH] IN BLOOD BY AUTOMATED COUNT: 13.5 % (ref 11–15)
EST. AVERAGE GLUCOSE BLD GHB EST-MCNC: 111 MG/DL
EST. AVERAGE GLUCOSE BLD GHB EST-SCNC: 6.2 MMOL/L
GLUCOSE SERPL-MCNC: 100 MG/DL (ref 65–99)
HBA1C MFR BLD: 5.5 %
HCT VFR BLD AUTO: 43.1 % (ref 35–45)
HCV AB SERPL QL IA: NORMAL
HDLC SERPL-MCNC: 58 MG/DL
HGB BLD-MCNC: 13.5 G/DL (ref 11.7–15.5)
LDLC SERPL CALC-MCNC: 149 MG/DL (CALC)
LYMPHOCYTES # BLD AUTO: 1777 CELLS/UL (ref 850–3900)
LYMPHOCYTES NFR BLD AUTO: 28.2 %
MCH RBC QN AUTO: 28.5 PG (ref 27–33)
MCHC RBC AUTO-ENTMCNC: 31.3 G/DL (ref 32–36)
MCV RBC AUTO: 90.9 FL (ref 80–100)
MICROALBUMIN UR-MCNC: 0.2 MG/DL
MONOCYTES # BLD AUTO: 391 CELLS/UL (ref 200–950)
MONOCYTES NFR BLD AUTO: 6.2 %
NEUTROPHILS # BLD AUTO: 3824 CELLS/UL (ref 1500–7800)
NEUTROPHILS NFR BLD AUTO: 60.7 %
NONHDLC SERPL-MCNC: 168 MG/DL (CALC)
PLATELET # BLD AUTO: 273 THOUSAND/UL (ref 140–400)
PMV BLD REES-ECKER: 11.5 FL (ref 7.5–12.5)
POTASSIUM SERPL-SCNC: 4.4 MMOL/L (ref 3.5–5.3)
PROT SERPL-MCNC: 6.8 G/DL (ref 6.1–8.1)
RBC # BLD AUTO: 4.74 MILLION/UL (ref 3.8–5.1)
SODIUM SERPL-SCNC: 140 MMOL/L (ref 135–146)
T4 FREE SERPL-MCNC: 1.8 NG/DL (ref 0.8–1.8)
TRIGL SERPL-MCNC: 83 MG/DL
TSH SERPL-ACNC: 0.14 MIU/L (ref 0.4–4.5)
WBC # BLD AUTO: 6.3 THOUSAND/UL (ref 3.8–10.8)

## 2025-07-29 DIAGNOSIS — E03.9 HYPOTHYROIDISM, UNSPECIFIED TYPE: ICD-10-CM

## 2025-08-01 ENCOUNTER — APPOINTMENT (OUTPATIENT)
Dept: PRIMARY CARE | Facility: CLINIC | Age: 65
End: 2025-08-01
Payer: MEDICARE

## 2025-08-01 VITALS
SYSTOLIC BLOOD PRESSURE: 132 MMHG | BODY MASS INDEX: 35.28 KG/M2 | TEMPERATURE: 96.4 F | DIASTOLIC BLOOD PRESSURE: 82 MMHG | HEIGHT: 59 IN | WEIGHT: 175 LBS | HEART RATE: 54 BPM

## 2025-08-01 DIAGNOSIS — F41.9 ANXIETY: ICD-10-CM

## 2025-08-01 DIAGNOSIS — Z01.419 ENCOUNTER FOR ROUTINE GYNECOLOGIC EXAMINATION IN MEDICARE PATIENT: ICD-10-CM

## 2025-08-01 DIAGNOSIS — Z23 NEED FOR VACCINATION: ICD-10-CM

## 2025-08-01 DIAGNOSIS — F51.04 PSYCHOPHYSIOLOGICAL INSOMNIA: ICD-10-CM

## 2025-08-01 DIAGNOSIS — Z12.4 CERVICAL CANCER SCREENING: ICD-10-CM

## 2025-08-01 DIAGNOSIS — R10.2 PELVIC CRAMPING: ICD-10-CM

## 2025-08-01 DIAGNOSIS — I10 STAGE 1 HYPERTENSION: ICD-10-CM

## 2025-08-01 DIAGNOSIS — E78.2 MIXED HYPERLIPIDEMIA: ICD-10-CM

## 2025-08-01 LAB
POC BILIRUBIN, URINE: NEGATIVE
POC BLOOD, URINE: ABNORMAL
POC GLUCOSE, URINE: NEGATIVE MG/DL
POC KETONES, URINE: NEGATIVE MG/DL
POC LEUKOCYTES, URINE: NEGATIVE
POC NITRITE,URINE: NEGATIVE
POC PH, URINE: 5.5 PH
POC PROTEIN, URINE: NEGATIVE MG/DL
POC SPECIFIC GRAVITY, URINE: 1.02
POC UROBILINOGEN, URINE: 0.2 EU/DL

## 2025-08-01 PROCEDURE — 87626 HPV SEP HI-RSK TYP&POOL RSLT: CPT

## 2025-08-01 RX ORDER — ESCITALOPRAM OXALATE 20 MG/1
10 TABLET ORAL DAILY
Qty: 45 TABLET | Refills: 0 | Status: SHIPPED | OUTPATIENT
Start: 2025-08-01

## 2025-08-01 RX ORDER — HYDROXYZINE HYDROCHLORIDE 25 MG/1
12.5 TABLET, FILM COATED ORAL NIGHTLY
Qty: 45 TABLET | Refills: 0 | Status: SHIPPED | OUTPATIENT
Start: 2025-08-01

## 2025-08-01 ASSESSMENT — ENCOUNTER SYMPTOMS: FATIGUE: 1

## 2025-08-01 NOTE — PROGRESS NOTES
Subjective   Patient ID: Sosa Feliz is a 65 y.o. female who presents for Follow-up (Follow up with pap also c/o cramping discuss possible cyst around vagina. ).    HPI   Subjective   Sosa Feliz is a 65 y.o. female who presents for annual exam. The patient has no complaints today. The patient is not currently sexually active. GYN screening history: last pap: approximate date  and was normal. The patient is not taking hormone replacement therapy. Patient denies post-menopausal vaginal bleeding.. The patient wears seatbelts: yes. The patient participates in regular exercise: strength training 2x/week, 10K steps/day. Has the patient ever been transfused or tattooed?: not asked. The patient reports that there is not domestic violence in their life.  R thigh   Menstrual History:  OB History          0    Para   0    Term   0            AB        Living             SAB        IAB        Ectopic        Multiple        Live Births                    Menarche age: 13  No LMP recorded. Patient is postmenopausal.    Anxiety-symptoms stable and well-controlled with reduced dose of Lexapro, 10 mg daily.  She continues to use hydroxyzine, 12.5 mg at bedtime to help with sleep onset, reduce anxiety to allow better quality of sleep.  She tolerates this well without adverse effect.  Pelvic cramping-recently has had intermittent cramping in the lower abdomen, feels similar to previous menstrual cramps.  Denies any urinary urgency, frequency, dysuria.  Hypothyroidism-continues to follow with endocrinology for management    Review of Systems   Constitutional:  Positive for fatigue. Negative for unexpected weight change.   Respiratory:  Negative for cough, chest tightness and shortness of breath.    Cardiovascular:  Negative for chest pain, palpitations and leg swelling.   Genitourinary:  Positive for pelvic pain (Cramping). Negative for difficulty urinating, dysuria, frequency, hematuria, urgency, vaginal bleeding  "and vaginal discharge.   Neurological:  Negative for dizziness, light-headedness and headaches.   Psychiatric/Behavioral:  Negative for dysphoric mood and sleep disturbance. The patient is not nervous/anxious.        Objective   /82 (BP Location: Left arm, Patient Position: Sitting)   Pulse 54   Temp 35.8 °C (96.4 °F)   Ht (!) 1.499 m (4' 11\")   Wt 79.4 kg (175 lb)   BMI 35.35 kg/m²     Physical Exam  Vitals reviewed.   Constitutional:       General: She is not in acute distress.     Appearance: Normal appearance. She is obese. She is not ill-appearing.   HENT:      Head: Normocephalic and atraumatic.      Right Ear: Tympanic membrane and ear canal normal.      Left Ear: Tympanic membrane and ear canal normal.      Nose: Nose normal. No congestion or rhinorrhea.      Mouth/Throat:      Mouth: Mucous membranes are moist.      Pharynx: Oropharynx is clear.     Eyes:      Extraocular Movements: Extraocular movements intact.      Conjunctiva/sclera: Conjunctivae normal.      Pupils: Pupils are equal, round, and reactive to light.       Cardiovascular:      Rate and Rhythm: Normal rate and regular rhythm.      Pulses: Normal pulses.      Heart sounds: Normal heart sounds. No murmur heard.  Pulmonary:      Effort: Pulmonary effort is normal. No respiratory distress.      Breath sounds: Normal breath sounds.   Chest:   Breasts:     Right: Normal. No mass, nipple discharge or skin change.      Left: Normal. No mass, nipple discharge or skin change.   Abdominal:      General: Abdomen is flat. Bowel sounds are normal.      Palpations: Abdomen is soft.      Tenderness: There is no abdominal tenderness.   Genitourinary:     General: Normal vulva.      Pubic Area: No rash.       Labia:         Right: No rash or lesion.         Left: No rash or lesion.       Urethra: No prolapse or urethral swelling.      Vagina: No erythema or bleeding.      Cervix: Normal.      Uterus: Normal. Not tender.       Adnexa: Right adnexa " normal and left adnexa normal.      Comments: Atrophy of the vaginal mucosa    Musculoskeletal:         General: Normal range of motion.      Cervical back: Normal range of motion and neck supple.   Lymphadenopathy:      Cervical: No cervical adenopathy.      Upper Body:      Right upper body: No axillary adenopathy.      Left upper body: No axillary adenopathy.     Skin:     General: Skin is warm and dry.     Neurological:      General: No focal deficit present.      Mental Status: She is alert and oriented to person, place, and time.     Psychiatric:         Mood and Affect: Mood normal.         Thought Content: Thought content normal.       Lab Results   Component Value Date    WBC 6.3 07/23/2025    HGB 13.5 07/23/2025    HCT 43.1 07/23/2025     07/23/2025    CHOL 226 (H) 07/23/2025    TRIG 83 07/23/2025    HDL 58 07/23/2025    ALT 11 07/23/2025    AST 17 07/23/2025     07/23/2025    K 4.4 07/23/2025     07/23/2025    CREATININE 0.85 07/23/2025    BUN 22 07/23/2025    CO2 24 07/23/2025    TSH 0.14 (L) 07/23/2025    HGBA1C 5.5 07/23/2025     The 10-year ASCVD risk score (Sacha ELIZALDE, et al., 2019) is: 6.2%    Values used to calculate the score:      Age: 65 years      Clincally relevant sex: Female      Is Non- : No      Diabetic: No      Tobacco smoker: No      Systolic Blood Pressure: 132 mmHg      Is BP treated: No      HDL Cholesterol: 58 mg/dL      Total Cholesterol: 226 mg/dL    Assessment/Plan   Assessment & Plan  Cervical cancer screening  Pap smear obtained  Orders:    THINPREP PAP TEST    Pelvic cramping  Urinalysis obtained today in office and unremarkable  Proceed with pelvic ultrasound  Orders:    POCT UA (nonautomated) manually resulted    US PELVIS TRANSABDOMINAL WITH TRANSVAGINAL; Future    Stage 1 hypertension  Reviewed most recent lipids and current 10-year ASCVD risk score of 6.2%.  Patient is interested in further stratification of her risk.  Proceed  with CT cardiac calcium score  Orders:    CT cardiac scoring wo IV contrast; Future    Encounter for routine gynecologic examination in Medicare patient  Mixed hyperlipidemia  Pelvic examination complete  Discussed general urinary changes consistent with menopause.  She declines any concerns or symptoms that affect her day-to-day.  Reviewed most recent mammogram and bone density testing.    Orders:    CT cardiac scoring wo IV contrast; Future    Need for vaccination    Orders:    Pneumococcal vaccine 20-valent    Psychophysiological insomnia  Continue hydroxyzine, tolerating well  Orders:    hydrOXYzine HCL (Atarax) 25 mg tablet; Take 0.5 tablets (12.5 mg) by mouth once daily at bedtime.    Anxiety  Lexapro reduced dose if symptoms are well-controlled.  Orders:    hydrOXYzine HCL (Atarax) 25 mg tablet; Take 0.5 tablets (12.5 mg) by mouth once daily at bedtime.    escitalopram (Lexapro) 20 mg tablet; Take 0.5 tablets (10 mg) by mouth once daily.

## 2025-08-02 PROBLEM — G56.02 CARPAL TUNNEL SYNDROME OF LEFT WRIST: Status: RESOLVED | Noted: 2023-10-17 | Resolved: 2025-08-02

## 2025-08-02 PROBLEM — M25.542 PAIN IN THUMB JOINT WITH MOVEMENT OF LEFT HAND: Status: RESOLVED | Noted: 2023-10-17 | Resolved: 2025-08-02

## 2025-08-02 ASSESSMENT — ENCOUNTER SYMPTOMS
COUGH: 0
DIFFICULTY URINATING: 0
NERVOUS/ANXIOUS: 0
HEMATURIA: 0
CHEST TIGHTNESS: 0
HEADACHES: 0
FREQUENCY: 0
PALPITATIONS: 0
SHORTNESS OF BREATH: 0
DYSURIA: 0
DYSPHORIC MOOD: 0
LIGHT-HEADEDNESS: 0
SLEEP DISTURBANCE: 0
UNEXPECTED WEIGHT CHANGE: 0
DIZZINESS: 0

## 2025-08-02 NOTE — ASSESSMENT & PLAN NOTE
Pelvic examination complete  Discussed general urinary changes consistent with menopause.  She declines any concerns or symptoms that affect her day-to-day.  Reviewed most recent mammogram and bone density testing.    Orders:    CT cardiac scoring wo IV contrast; Future

## 2025-08-02 NOTE — ASSESSMENT & PLAN NOTE
Continue hydroxyzine, tolerating well  Orders:    hydrOXYzine HCL (Atarax) 25 mg tablet; Take 0.5 tablets (12.5 mg) by mouth once daily at bedtime.

## 2025-08-02 NOTE — ASSESSMENT & PLAN NOTE
Lexapro reduced dose if symptoms are well-controlled.  Orders:    hydrOXYzine HCL (Atarax) 25 mg tablet; Take 0.5 tablets (12.5 mg) by mouth once daily at bedtime.    escitalopram (Lexapro) 20 mg tablet; Take 0.5 tablets (10 mg) by mouth once daily.

## 2025-08-07 ENCOUNTER — PATIENT MESSAGE (OUTPATIENT)
Dept: PRIMARY CARE | Facility: CLINIC | Age: 65
End: 2025-08-07
Payer: MEDICARE

## 2025-08-10 DIAGNOSIS — F41.9 ANXIETY: ICD-10-CM

## 2025-08-12 ENCOUNTER — PATIENT MESSAGE (OUTPATIENT)
Dept: PRIMARY CARE | Facility: CLINIC | Age: 65
End: 2025-08-12
Payer: MEDICARE

## 2025-08-12 RX ORDER — ESCITALOPRAM OXALATE 10 MG/1
10 TABLET ORAL DAILY
Qty: 90 TABLET | Refills: 1 | Status: SHIPPED | OUTPATIENT
Start: 2025-08-12

## 2025-08-14 LAB
CYTOLOGY CMNT CVX/VAG CYTO-IMP: NORMAL
HPV HR 12 DNA GENITAL QL NAA+PROBE: NEGATIVE
HPV HR GENOTYPES PNL CVX NAA+PROBE: NEGATIVE
HPV16 DNA SPEC QL NAA+PROBE: NEGATIVE
HPV18 DNA SPEC QL NAA+PROBE: NEGATIVE
LAB AP HPV GENOTYPE QUESTION: YES
LAB AP HPV HR: NORMAL
LABORATORY COMMENT REPORT: NORMAL
LABORATORY COMMENT REPORT: NORMAL
MENSTRUAL HX REPORTED: NORMAL
PATH REPORT.TOTAL CANCER: NORMAL

## 2025-08-20 ENCOUNTER — HOSPITAL ENCOUNTER (OUTPATIENT)
Dept: RADIOLOGY | Facility: CLINIC | Age: 65
Discharge: HOME | End: 2025-08-20
Payer: MEDICARE

## 2025-08-20 DIAGNOSIS — R10.2 PELVIC CRAMPING: ICD-10-CM

## 2025-08-20 PROCEDURE — 76856 US EXAM PELVIC COMPLETE: CPT

## 2025-08-27 DIAGNOSIS — F51.04 PSYCHOPHYSIOLOGICAL INSOMNIA: ICD-10-CM

## 2025-08-27 DIAGNOSIS — F41.9 ANXIETY: ICD-10-CM

## 2025-08-28 RX ORDER — HYDROXYZINE HYDROCHLORIDE 25 MG/1
25 TABLET, FILM COATED ORAL NIGHTLY
Qty: 90 TABLET | Refills: 1 | Status: SHIPPED | OUTPATIENT
Start: 2025-08-28

## 2025-09-06 ENCOUNTER — APPOINTMENT (OUTPATIENT)
Dept: RADIOLOGY | Facility: HOSPITAL | Age: 65
End: 2025-09-06
Payer: MEDICARE

## 2026-01-30 ENCOUNTER — APPOINTMENT (OUTPATIENT)
Dept: PRIMARY CARE | Facility: CLINIC | Age: 66
End: 2026-01-30
Payer: MEDICARE

## 2026-06-16 ENCOUNTER — APPOINTMENT (OUTPATIENT)
Dept: ENDOCRINOLOGY | Facility: CLINIC | Age: 66
End: 2026-06-16
Payer: MEDICARE